# Patient Record
(demographics unavailable — no encounter records)

---

## 2024-10-10 NOTE — LETTER BODY
[FreeTextEntry1] : Dear Dr. Mark Ayala,  Thank you for referring your patient Edward Robles for consultation for fertility concerns.  I have requested several baseline blood studies and a semen analysis. I will see the patient back in followup shortly and make further recommendations. I have attached a copy of my consultation note for your records.  Thank you again for this kind referral. I will certainly keep you updated with further progress. Please do not hesitate to call me if you have any questions.  Best regards,    Jeff Briscoe M.D., PhD Professor of Urology    [facility] [employer] [facility]  for , Reproductive and Sexual Medicine    [facility]

## 2024-10-10 NOTE — LETTER BODY
[FreeTextEntry1] : Dear Dr. Mark Ayala,  Thank you for referring your patient Edward Robles for consultation for fertility concerns.  I have requested several baseline blood studies and a semen analysis. I will see the patient back in followup shortly and make further recommendations. I have attached a copy of my consultation note for your records.  Thank you again for this kind referral. I will certainly keep you updated with further progress. Please do not hesitate to call me if you have any questions.  Best regards,    Jeff Briscoe M.D., PhD Professor of Urology    [facility] of [facility]  for , Reproductive and Sexual Medicine    [facility]

## 2024-10-10 NOTE — HISTORY OF PRESENT ILLNESS
[FreeTextEntry1] : Patient is a 60-year-old gentleman with a history of metastatic melanoma who presents with the chief complaint of impaired fertility for evaluation. The patient denies fevers, chills, nausea and/or vomiting and no unexplained weight loss.   I reviewed the questionnaire he had completed with him in detail.  His partner, age 33, was not able to accompany him to the visit today ([facility]). She has not had any prior pregnancies. As I understand her evaluation has been normal to date. They have been trying to achieve pregnancy for the past 6 months without conception. This issue causes both he and his partner significant distress.   He has no known drug allergies.  His past medical history demonstrates no significant urologic issues (see patient questionnaire).  In his present occupation as a he has no known toxin exposure.  He does not smoke and drinks only socially.  He has no known drug allergies.  His review of systems is non-contributory. His family history is not significant. A chaperone was offered to be present for the examination but declined by the patient.

## 2024-10-10 NOTE — HISTORY OF PRESENT ILLNESS
[FreeTextEntry1] : Patient is a 60-year-old gentleman with a history of metastatic melanoma who presents with the chief complaint of impaired fertility for evaluation. The patient denies fevers, chills, nausea and/or vomiting and no unexplained weight loss.   I reviewed the questionnaire he had completed with him in detail.  His partner, age 33, was not able to accompany him to the visit today ([facility]). She has not had any prior pregnancies. As I understand her evaluation has been normal to date. They have been trying to achieve pregnancy for the past 6 months without conception. This issue causes both he and his partner significant distress.   He has no known drug allergies.  His past medical history demonstrates no significant urologic issues (see patient questionnaire).  In his present occupation as a he has no known toxin exposure.  He does not smoke and drinks only socially.  He has no known drug allergies.  His review of systems is non-contributory. His family history is not significant. A chaperone was offered to be present for the examination but declined by the patient.

## 2024-10-10 NOTE — ASSESSMENT
[FreeTextEntry1] : This pleasant  gentleman presents with concerns regarding his fertility.  I have requested several baseline blood studies, a semen analysis and additional imaging.  Urine dip and microscopic analysis was requested. We discussed his evaluation today and possible options for therapy depending upon the results of his testing. I will make more specific recommendations after the results of the requested tests return. 1. Review blood studies and semen analysis on follow up 2. Discuss options for therapy on follow up 3. Sperm banking per Dr. Ayala  Consultation: 45 minutes reviewing his history and discussing prior results, discussing various treatment options, writing medication prescriptions, requests for lab testing and writing his note. There was also additional time in preparing for the visit.

## 2024-10-23 NOTE — HISTORY OF PRESENT ILLNESS
[de-identified] : Mr. Robles is a 56 year old gentlemen with no significant past medical history presenting to the office for an initial consultation of melanoma.  Patient has been in the [facility] for 6 years. 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 Lived in most of [place] for over 20 years.  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 Has been an athlete for many decades. He is vegan. He swims a up to a couple miles daily.  He is open to complementary medicine, and meditation.   Patient noted development of a solitary hyperpigmented plaque over the right side of chest, which was gradually increasing in diameter with a slight change in color. Patient has history of significant sun exposure and was not fond of using sun protection. Punch Biopsy on 3/2019. Excisional biopsy taken of lesion 9/13/2019. Wide local excision 11/13/2020: Pathology: Consistent with melanoma. Axilla lymph node: 1/1 consistent with melanoma. Thickness, ulceration status and IHC are not available at this time.  CT chest 9/2019: No evidence of metastatic disease 12/23/2020 CT head, chest and abdomen performed in the [facility]: CT head negative for metastatic disease; CT chest demonstrates an increase in the size of the previously seen 8x5 mm nodule in the upper outer right anterior chest which currently measures 14x10 mm.; CT abdomen no evidence of metastatic disease. CT chest - RLL 6 mm and LLL 3 mm (compared with CT 9/2019).   2/19/21: I discussed PET scan and CT with radiologist. Lung lesions too small to confirm melanoma by CT biopsy. Thoracic surgeon would need to be consulted. I called Dr. Woodard, and he believes that the lesion in the right lung is accessible surgically. Also he thinks that there is a possibility of infection as opposed to melanoma. Patient is aware of this and is making an appointment. Patient slides obtained, and confirmed Stage 3b. We discussed that if the lungs are negative for melanoma, then would recommend 1 year adjuvant Opdivo as adjuvant.  3/12/2021 Patient here to review pathology, labs, films. He feels well and is able to play tennis  3/30/2021: 1) metastatic melanoma: Patient is here for C1 NIvolumab.He is feeling well physically and psychologically and ready to start treatment.  We re-reviewed logistics, mechanism of action and most common irAEs from Nivolumab. CT chest 3/30/2021: In comparison with 12/9/2021, status post interval right lower lobe wedge resection. 6 mm right lower lobe nodule (series 2 image 96) is enlarged; previously 2 mm. The remaining 8 mm and smaller lung nodules annotated on series 2 are stable. Difficult to delineate asymmetric soft tissue density lateral to the right pectoral muscle (series 2 image 38) is not significantly changed in size but contains a new small focus of hypodensity (series 601B image 74), possibly postprocedural seroma. 2) COVID-19 infection: Patient was tested positive for COVID-19 on 3/16.  He is asymptomatic and denies fever, chills, change in taste/smell, cough, SOB , diarrhea or fatigue. 3) Vaccine: Rah and Rah vaccine was given on 3/25/2021.   4/14/2021: 1) Metastatic melanoma: Patient is here for toxicity evaluation s/p one cycle of Nivolumab on 3/30/2021.  Patient did well and reports no significant irAEs.  Denies fever, chills, fatigue, arthralgia, myalgia, cough, rash, diarrhea, cough or chest pain. 2) Facial burning: Patient underwent Blue light procedure on 4/13/2021 with Dermatology.  He noticed "burning" on his face a couple of hour after his procedure which is causing some irritation.  He is scheduled to be evaluated by dermatology today.  5/11/2021: Metastatic melanoma: Patient is here for toxicity evaluation s/p two cycle of Nivolumab. Patient did well and reports no significant irAEs.  Denies fever, chills, fatigue, arthralgia, myalgia, cough, rash, diarrhea, cough or chest pain. COVID side effects have improved and completely resolved. No irAEs Has started swimming again [employer] after C#4  5/25/2021: 1) melanoma: Patient is here for C3 Nivolumab.  He is doing well and reports no significant irAEs.   Denies fever, chills, fatigue, arthralgia, myalgia, cough, rash, diarrhea, cough or chest pain. Patient admits to grade one fatigue which he is able to push through.  No restrictions with his ADLs.  He is able to swim. We reviewed labs, no acute abnormality noted.  WIll recheck TSH levels today.  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 patient had covid and is feeling better but not at baseline - he is able to swim long distances but notes he gets fatigued at times when walks distances Due for CT scan next week.  9/14/2021: 1) Melanoma: Patient is here for C7 Nivolumab. Patient is doing well on treatment and voices no new complaints. Denies any significant irAEs. Denies fever, chills,  cough, SOB, arthralgia, myalgia, diarrhea. 2) checkpoint inhibitor induced dermatitis, : Has improved. Currently not using any ointments or cream.  Patient has been moisturizer. 3) Hypothyroid: Patient was evaluated by Endocrine, Dr. Martell who recommended Levothyroxine 125mcg Her thyroid functions are improving  4) Social: Patient is currently living at apartment close to [facility] house. He is currently not working.  11/9/2021: 1) Melanoma: Patient is here for C9 Nivolumab. Patient is doing well on treatment and voices no new complaints. Denies any significant irAEs today.  Denies fever, chills, cough, SOB, arthralgia, myalgia, diarrhea. 2) checkpoint inhibitor induced dermatitis: Has improved on phototherapy & topical steroids not currently using any ointments or cream.  Patient has been moisturizer. 3) Hypothyroid: Patient was evaluated by Endocrine, Dr. Martell who recommended Levothyroxine 137 mcg Her thyroid functions are improving. 4) Right upper quadrant pain: Patient admits to pain in the right lateral abdominal area x few months. Will have radiology review imaging from 10/2021. Appears to be musculoskeletal in nature.   1) Melanoma:  C12 Nivolumab. He is tolerating treatment well and reports no significant irAEs. Labs reviewed with patient on 1/4/2022, no acute abnormality. CT chest, abdomen/pelvis ordered. He has no restrictions with his ADLs. He is now ice skating. Diet and weight remain stable. He is being followed by Dr. 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  Currently on Levothyroxine 137 mcg.  3/1/2022 1)Melanoma C13 Nivolumab today. He is tolerating treatment extremely well and reports no significant irAEs. Patient underwent CT chest, abdomen/pelvis on 2/15/2022 which demonstrated the previously noted right lower lobe nodule no longer visualized. Otherwise, stable exam. He was evaluated by endocrine for hypothyroid, patient is now on Levothyroxine 150 mcg daily. He has no restrictions with his ADLs. Patient admits to grade one fatigue but is able to push through . He is able to swim ~ 2 miles/day.  Continues UV q 2 weeks, and will discuss stopping it. Notes dry mouth and taste.  3/29/2022: C14 Nivolumab today. He is tolerating treatment relatively well and reports no significant irAEs. He has no restrictions with his ADLs. Labs reviewed and LFT's are trending upwards. Grade one fatigue but is able to push through. He is currently on Levothyroxine 150 mcg daily and doing well. He is adjusting to the dose and will not start Cytomel at this time. Due to dry mouth Sjogren syndrome labs were ordered however not performed last visit, and will do so this time. He does note improved taste after a course of clotrimazole.  4/26/2022: C15 Nivolumab today. Labs reviewed, no acute abnormality. Thyroid functions remain WNL. Sjogren labs performed due to dry mouth, labs WNL. Tolerating treatment extremely well and reports on significant irAEs. Danny (Signatera) performed on 3/1/2022: Signatera Positive ; MTM/mL: 0.65  5/24/22 Patient will have C16 nivolumab. Repeat ct DNA next cycle. If positive, get PET. If negative, consider a few more treatments and stop or complete 2 year course. Thyroid issue seems to be addressed. Phototherapy is now once a month. Had stopped and then restarted due to increase in dermatitis. Mental state is positive. Remains underactive in terms of exercise. Has been supporting his father and family with medical issues.  7/19/2022 C18 Nivolumab today. Patient is tolerating treatment well and reports no significant irAEs. Patient is no longer receiving light therapy from dermatology. Patient is currently on Levothyroxine from 150 mcg to 137 mcg. Admits to grade one fatigue intermittently but has improved. He is now back to baseline. He continues to swim and has no restrictions with his ADLs.  8/16/22 C19 Nivolumab Doing well overall. Lisa has converted to negative on 7/19/22 Will continue CT scan q 4 months. Needs full TFTs today. Able to swim for long periods of time. Stopped phototherapy for  checkpoint inhibitor induced dermatitis. His only complaint is ear scaling.  9/15/2022: C20 Nivolumab on 9/13/2022 He is doing well on treatment and voices no major irAEs. Grade one fatigue but is able to push through. We reviewed his CT chest, abdomen/pelvis on 8/2022. Next CT 12/2022. Lisa on draw#2 negative on 7/29/2022 Next draw 10/2022.  10/07/2022 S/P C20 Nivolumab on 09/13/2022, and next cycle will be on 10/11/2022 He c/o pain to left side lower back. Also, he has some fatigue. His ear infection has cleared with use of medicated drops. CT-Scan of chest of 8/25/2022 showed slight increase of right chest wall nodule. Will get US and possible biopsy of nodule to r/o abnormal pathology.  11/08/2022: C22 Nivolumab today. US biopsy of chest wall negative on 10/2022. Patient is feeling well today and voices no major irAEs. Admits to grade one fatigue intermittently however is able to push through. Danny franklin D#3 today. Intermittent rash on his lower back and forehead.  He is using Triamcinolone on his lower back and Metronidazole on his forehead.  12/6/22 Patient here for C23 Will complete one more dose and then start surveillance with scans and ct-DNA q 3 months. Will obtain lab monitoring at home. He dose still have fatigue and thinks that it is related to low testosterone. Has not been sexually active.  01/03/2023 C24 Nivolumab today. Lisa Delgado D#3 on 11/2022 negative. D#4 02/2023 CT chest, abdomen/pelvis on 12/15/2022 which revealed no evidence of recurrent and/or metastatic disease. He is tolerating treatment relatively well and reports no major irAEs. Labs reviewed. Hormones are WNL.  01/31/2023: C25 Nivolumab today. Will treat for 26 cycles and stop. No major irAEs. D#4 Signatera on 01/03/2023 negative. He is doing well and voices no major complaints. Patient has gained weight while on treatment. Has not been exercising as often.  02/28/2023 C26 Nivolumab today (last treatment) No major irAEs. CT chest, abdomen/pelvis on 03/02/2023. He was seen by cardiology (Dr. Bingham) due to CORLEY. He underwent ECG and TTE which revealed no acute abnormality. His EF is 59%, normal LV systolic function. His exercise induced stress test did not reveal any acute abnormality. He was advised to undergo CT chest (calcium score) and will schedule.  07/07/2023 He is here for his follow up for metastatic melanoma. He was seen by Dr. Zaragoza (Ortho) for bilateral foot pain, R>L and swelling x 3 weeks. Patient has been doing PT and home stretch exercises which has been helping. His last signatera on 04/2023 was negative. Will repeat today. Last CT on 03/2023 stable. Next CT on 09/2023. Patient continues to have issues with his energy level. He is resting more and not as active however has no restrictions with his routine/instrumental ADLs. Patient has gained weight.  3/7/2024 Signatera Danny 2/19/2024 = 0.00 CT 2/21/2024 LUNGS AND LARGE AIRWAYS: Patent central airways. Stable submillimeter right upper lobe pulmonary nodule on image 27 of series 303. Left lower lobe tiny nodules bilaterally seen on the current exam. Patient is again noted to be post right lower lobe wedge resection PLEURA: No pleural effusion. VESSELS: Within normal limits. HEART: Heart size is normal. No pericardial effusion. MEDIASTINUM AND BRIDGER: No lymphadenopathy. CHEST WALL AND LOWER NECK: Scattered subcutaneous nodules similar in appearance to prior study. Representative nodule on image 29 of series 303 measures 7 mm and is unchanged. Right axillary scarring is also unchanged. Right sided mild gynecomastia is stable as well. LIVER: Within normal limits. BILE DUCTS: Normal caliber. GALLBLADDER: Within normal limits. SPLEEN: Unremarkable PANCREAS: Within normal limits. ADRENALS: Within normal limits. KIDNEYS/URETERS: Within normal limits. BLADDER: Minimally distended. REPRODUCTIVE ORGANS: Prostate within normal limits. BOWEL: No bowel obstruction. Appendix is normal. A small hiatal hernia is seen with mild colonic diverticulosis without diverticulitis PERITONEUM: No ascites VESSELS: Within normal limits. RETROPERITONEUM/LYMPH NODES: No lymphadenopathy. Prominent bilateral inguinal lymph nodes are unchanged. ABDOMINAL WALL: 2 small fat-containing inguinal hernias. Small midline fat-containing ventral hernia. BONES: Degenerative changes. IMPRESSION: Stable examination.  Feeling well and voices no new complaints. Addressed all questions. Looking to lose some weight. Plans to go to the [facility]. He is looking to take care of his parents more here, and work with [facility] remotely.  7/1/24 Last Signatera Danny on 6/12 is not detected. Will repeat in October 2024. Feeling well and voices no major complaints. 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  10/3/2024 Patient with h/o melanoma just in lungs. He was on treatment for 2 years. Had imaging done at [facility]. I had it loaded. Will ask radiology to review the PET - there is a liver and spine T7 lesion. Had rise of Signatera.  10/23/24 He is for C1 Ipilimumab (3) + Nivolumab (1) Was evaluated by Dr. Jeff Briscoe (Urology) for sperm banking. Patient wanted to discuss his labs prior to receiving therapy. He was educated no contraindications to receive therapy with positive Hb AB. He was positive in the past (2/2021) and had negative antigens. He was told his syphillis titers were positive. Unable to located in his chart. Requested labs be sent over to our team. He will need to be seen one week prior to C2 for toxicity evaluation and labs. [de-identified] : Surgical Oncology: Santos Seals\par  Thoracic Surgeon: Gatito Woodard \par  \par  Pt's sister Yohana Baca is best contact:  [phone]\par  \par  Patient sister home: [phone]\par  Patient new cell: [phone]\par  Back up number - [phone]

## 2024-10-23 NOTE — ASSESSMENT
[FreeTextEntry1] : Patient has melanoma on right chest resected 11/2020. A positive right axillary node also found. On CT scan 12/2020 there are 2 subcentimeter nodules that are reported to be new since 9/2019. Patient has returned from [facility] for further evaluation and treatment. This is considered Stage 3b. PET and CT are reviewed today. The CT shows nodules, and patient underwent biopsy with Dr Woodard.   Nivolumab  C1 3/30/2021. C26 02/28/2023 - last treatment.  Danny (Signatera) performed on 3/1/2022: Signatera Positive had converted to negative on 7/19/2022.  Then it turned positive again August 29, 2024 ctDNA = 10.11  Patient with h/o melanoma just in lungs. He was on treatment for 2 years. Had imaging done at [facility]. I had it loaded. Will ask radiology to review the PET - there is a liver and spine T7 lesion. Had rise of Signatera.  IPI 3 + NIVO 1 C1D1 10/23  He is for C1 Ipilimumab (3) + Nivolumab (1)  Was evaluated by Dr. Jeff Briscoe (Urology) for sperm banking. Patient wanted to discuss his labs prior to receiving therapy. He was educated no contraindications to receive therapy with positive Hb AB. He was positive in the past (2/2021) and had negative antigens. He was told his syphillis titers were positive. Unable to located in his chart. Requested labs be sent over to our team.  He will need to be seen one week prior to C2 for toxicity evaluation and labs.  Dr. Ayala agrees with plan.

## 2024-10-25 NOTE — PHYSICAL EXAM
[General Appearance - Alert] : alert [General Appearance - In No Acute Distress] : in no acute distress [] : no respiratory distress [Respiration, Rhythm And Depth] : normal respiratory rhythm and effort [Exaggerated Use Of Accessory Muscles For Inspiration] : no accessory muscle use [Heart Rate And Rhythm] : heart rate and rhythm were normal [Arterial Pulses Normal] : the arterial pulses were normal [Abdomen Soft] : soft [Nondistended] : nondistended [Nail Clubbing] : no clubbing  or cyanosis of the fingernails [Normal] : normal skin color and pigmentation and no rash [No Focal Deficits] : no focal deficits [Oriented To Time, Place, And Person] : oriented to person, place, and time

## 2024-10-30 NOTE — HISTORY OF PRESENT ILLNESS
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 Punch Biopsy on 3/2019. Excisional biopsy taken of lesion 9/13/2019. Wide local excision 11/13/2020: Pathology: Consistent with melanoma. Axilla lymph node: 1/1 consistent with melanoma. Thickness, ulceration status and IHC are not available at this time.  CT chest 9/2019: No evidence of metastatic disease 12/23/2020 CT head, chest and abdomen performed in the [facility]: CT head negative for metastatic disease; CT chest demonstrates an increase in the size of the previously seen 8x5 mm nodule in the upper outer right anterior chest which currently measures 14x10 mm.; CT abdomen no evidence of metastatic disease. CT chest - RLL 6 mm and LLL 3 mm (compared with CT 9/2019)  3/2/2021 Patient underwent Lung RLL wedge resection for an increasing lung nodule, pathology consistent with metstatsic melanoma   3/30/2021 Patient was started on Nivolumab for  metastatic melanoma:  CT chest 3/30/2021: In comparison with 12/9/2021, status post interval right lower lobe wedge resection. 6 mm right lower lobe nodule (series 2 image 96) is enlarged; previously 2 mm. The remaining 8 mm and smaller lung nodules annotated on series 2 are stable. Difficult to delineate asymmetric soft tissue density lateral to the right pectoral muscle (series 2 image 38) is not significantly changed in size but contains a new small focus of hypodensity (series 601B image 74), possibly postprocedural seroma.  9/18/2024 MRI Brain showed No evidence of brain metastases  9/18/2024 PET/CT showed FDG avid lesions as described above including gallbladder fossa and right T7 vertebral body  10/10/2024 MRI Thoracic spine Indeterminate enhancing T1 hypointense lesion within the right aspect of the T7 vertebral body, which exhibits FDG avidity on prior PET/CT dated 9/18/2024. No significant FDG avidity was seen in this region on prior PET/CT dated 2/9/2021. This lesion is suspicious for metastatic disease within the T7 vertebral body Indeterminate STIR hyperintense, possibly enhancing focus within the T10 vertebral body, measuring up to 3 mm, which may represent a tiny hemangioma versus metastatic disease within the T10 vertebral body. Attention on follow-up imaging is recommended.  10/18/2024 CT chest/abdomen/pelvis New hepatic segment 5 lesion which correlates with FDG avid focus seen in PET/CT, concerning for metastasis. There is a new lytic lesion in the T7 vertebral body which correlates with FDG avid focus seen in PET/CT, also concerning for metastasis.  10/21/24 Patient presents to discuss the role of radiation therapy in his care. Denies pain. Generally doing well.

## 2024-10-30 NOTE — HISTORY OF PRESENT ILLNESS
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 Punch Biopsy on 3/2019. Excisional biopsy taken of lesion 9/13/2019. Wide local excision 11/13/2020: Pathology: Consistent with melanoma. Axilla lymph node: 1/1 consistent with melanoma. Thickness, ulceration status and IHC are not available at this time.  CT chest 9/2019: No evidence of metastatic disease 12/23/2020 CT head, chest and abdomen performed in the [facility]: CT head negative for metastatic disease; CT chest demonstrates an increase in the size of the previously seen 8x5 mm nodule in the upper outer right anterior chest which currently measures 14x10 mm.; CT abdomen no evidence of metastatic disease. CT chest - RLL 6 mm and LLL 3 mm (compared with CT 9/2019)  3/2/2021 Patient underwent Lung RLL wedge resection for an increasing lung nodule, pathology consistent with metstatsic melanoma   3/30/2021 Patient was started on Nivolumab for  metastatic melanoma:  CT chest 3/30/2021: In comparison with 12/9/2021, status post interval right lower lobe wedge resection. 6 mm right lower lobe nodule (series 2 image 96) is enlarged; previously 2 mm. The remaining 8 mm and smaller lung nodules annotated on series 2 are stable. Difficult to delineate asymmetric soft tissue density lateral to the right pectoral muscle (series 2 image 38) is not significantly changed in size but contains a new small focus of hypodensity (series 601B image 74), possibly postprocedural seroma.  9/18/2024 MRI Brain showed No evidence of brain metastases  9/18/2024 PET/CT showed FDG avid lesions as described above including gallbladder fossa and right T7 vertebral body  10/10/2024 MRI Thoracic spine Indeterminate enhancing T1 hypointense lesion within the right aspect of the T7 vertebral body, which exhibits FDG avidity on prior PET/CT dated 9/18/2024. No significant FDG avidity was seen in this region on prior PET/CT dated 2/9/2021. This lesion is suspicious for metastatic disease within the T7 vertebral body Indeterminate STIR hyperintense, possibly enhancing focus within the T10 vertebral body, measuring up to 3 mm, which may represent a tiny hemangioma versus metastatic disease within the T10 vertebral body. Attention on follow-up imaging is recommended.  10/18/2024 CT chest/abdomen/pelvis New hepatic segment 5 lesion which correlates with FDG avid focus seen in PET/CT, concerning for metastasis. There is a new lytic lesion in the T7 vertebral body which correlates with FDG avid focus seen in PET/CT, also concerning for metastasis.  10/21/24 Patient presents to discuss the role of radiation therapy in his care. Denies pain. Generally doing well.

## 2024-10-30 NOTE — HISTORY OF PRESENT ILLNESS
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 Punch Biopsy on 3/2019. Excisional biopsy taken of lesion 9/13/2019. Wide local excision 11/13/2020: Pathology: Consistent with melanoma. Axilla lymph node: 1/1 consistent with melanoma. Thickness, ulceration status and IHC are not available at this time.  CT chest 9/2019: No evidence of metastatic disease 12/23/2020 CT head, chest and abdomen performed in the [facility]: CT head negative for metastatic disease; CT chest demonstrates an increase in the size of the previously seen 8x5 mm nodule in the upper outer right anterior chest which currently measures 14x10 mm.; CT abdomen no evidence of metastatic disease. CT chest - RLL 6 mm and LLL 3 mm (compared with CT 9/2019)  3/2/2021 Patient underwent Lung RLL wedge resection for an increasing lung nodule, pathology consistent with metstatsic melanoma   3/30/2021 Patient was started on Nivolumab for  metastatic melanoma:  CT chest 3/30/2021: In comparison with 12/9/2021, status post interval right lower lobe wedge resection. 6 mm right lower lobe nodule (series 2 image 96) is enlarged; previously 2 mm. The remaining 8 mm and smaller lung nodules annotated on series 2 are stable. Difficult to delineate asymmetric soft tissue density lateral to the right pectoral muscle (series 2 image 38) is not significantly changed in size but contains a new small focus of hypodensity (series 601B image 74), possibly postprocedural seroma.  9/18/2024 MRI Brain showed No evidence of brain metastases  9/18/2024 PET/CT showed FDG avid lesions as described above including gallbladder fossa and right T7 vertebral body  10/10/2024 MRI Thoracic spine Indeterminate enhancing T1 hypointense lesion within the right aspect of the T7 vertebral body, which exhibits FDG avidity on prior PET/CT dated 9/18/2024. No significant FDG avidity was seen in this region on prior PET/CT dated 2/9/2021. This lesion is suspicious for metastatic disease within the T7 vertebral body Indeterminate STIR hyperintense, possibly enhancing focus within the T10 vertebral body, measuring up to 3 mm, which may represent a tiny hemangioma versus metastatic disease within the T10 vertebral body. Attention on follow-up imaging is recommended.  10/18/2024 CT chest/abdomen/pelvis New hepatic segment 5 lesion which correlates with FDG avid focus seen in PET/CT, concerning for metastasis. There is a new lytic lesion in the T7 vertebral body which correlates with FDG avid focus seen in PET/CT, also concerning for metastasis.  10/21/24 Patient presents to discuss the role of radiation therapy in his care. Denies pain. Generally doing well.

## 2024-10-30 NOTE — HISTORY OF PRESENT ILLNESS
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 Punch Biopsy on 3/2019. Excisional biopsy taken of lesion 9/13/2019. Wide local excision 11/13/2020: Pathology: Consistent with melanoma. Axilla lymph node: 1/1 consistent with melanoma. Thickness, ulceration status and IHC are not available at this time.  CT chest 9/2019: No evidence of metastatic disease 12/23/2020 CT head, chest and abdomen performed in the [facility]: CT head negative for metastatic disease; CT chest demonstrates an increase in the size of the previously seen 8x5 mm nodule in the upper outer right anterior chest which currently measures 14x10 mm.; CT abdomen no evidence of metastatic disease. CT chest - RLL 6 mm and LLL 3 mm (compared with CT 9/2019)  3/2/2021 Patient underwent Lung RLL wedge resection for an increasing lung nodule, pathology consistent with metstatsic melanoma   3/30/2021 Patient was started on Nivolumab for  metastatic melanoma:  CT chest 3/30/2021: In comparison with 12/9/2021, status post interval right lower lobe wedge resection. 6 mm right lower lobe nodule (series 2 image 96) is enlarged; previously 2 mm. The remaining 8 mm and smaller lung nodules annotated on series 2 are stable. Difficult to delineate asymmetric soft tissue density lateral to the right pectoral muscle (series 2 image 38) is not significantly changed in size but contains a new small focus of hypodensity (series 601B image 74), possibly postprocedural seroma.  9/18/2024 MRI Brain showed No evidence of brain metastases  9/18/2024 PET/CT showed FDG avid lesions as described above including gallbladder fossa and right T7 vertebral body  10/10/2024 MRI Thoracic spine Indeterminate enhancing T1 hypointense lesion within the right aspect of the T7 vertebral body, which exhibits FDG avidity on prior PET/CT dated 9/18/2024. No significant FDG avidity was seen in this region on prior PET/CT dated 2/9/2021. This lesion is suspicious for metastatic disease within the T7 vertebral body Indeterminate STIR hyperintense, possibly enhancing focus within the T10 vertebral body, measuring up to 3 mm, which may represent a tiny hemangioma versus metastatic disease within the T10 vertebral body. Attention on follow-up imaging is recommended.  10/18/2024 CT chest/abdomen/pelvis New hepatic segment 5 lesion which correlates with FDG avid focus seen in PET/CT, concerning for metastasis. There is a new lytic lesion in the T7 vertebral body which correlates with FDG avid focus seen in PET/CT, also concerning for metastasis.  10/21/24 Patient presents to discuss the role of radiation therapy in his care. Denies pain. Generally doing well.

## 2024-11-06 NOTE — HISTORY OF PRESENT ILLNESS
[de-identified] : Mr. Robles is a 56 year old gentlemen with no significant past medical history presenting to the office for an initial consultation of melanoma.  Patient has been in the [facility] for 6 years. 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 Lived in most of [place] for over 20 years.  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 Has been an athlete for many decades. He is vegan. He swims a up to a couple miles daily.  He is open to complementary medicine, and meditation.   Patient noted development of a solitary hyperpigmented plaque over the right side of chest, which was gradually increasing in diameter with a slight change in color. Patient has history of significant sun exposure and was not fond of using sun protection. Punch Biopsy on 3/2019. Excisional biopsy taken of lesion 9/13/2019. Wide local excision 11/13/2020: Pathology: Consistent with melanoma. Axilla lymph node: 1/1 consistent with melanoma. Thickness, ulceration status and IHC are not available at this time.  CT chest 9/2019: No evidence of metastatic disease 12/23/2020 CT head, chest and abdomen performed in the [facility]: CT head negative for metastatic disease; CT chest demonstrates an increase in the size of the previously seen 8x5 mm nodule in the upper outer right anterior chest which currently measures 14x10 mm.; CT abdomen no evidence of metastatic disease. CT chest - RLL 6 mm and LLL 3 mm (compared with CT 9/2019).   2/19/21: I discussed PET scan and CT with radiologist. Lung lesions too small to confirm melanoma by CT biopsy. Thoracic surgeon would need to be consulted. I called Dr. Woodard, and he believes that the lesion in the right lung is accessible surgically. Also he thinks that there is a possibility of infection as opposed to melanoma. Patient is aware of this and is making an appointment. Patient slides obtained, and confirmed Stage 3b. We discussed that if the lungs are negative for melanoma, then would recommend 1 year adjuvant Opdivo as adjuvant.  3/12/2021 Patient here to review pathology, labs, films. He feels well and is able to play tennis  3/30/2021: 1) metastatic melanoma: Patient is here for C1 NIvolumab.He is feeling well physically and psychologically and ready to start treatment.  We re-reviewed logistics, mechanism of action and most common irAEs from Nivolumab. CT chest 3/30/2021: In comparison with 12/9/2021, status post interval right lower lobe wedge resection. 6 mm right lower lobe nodule (series 2 image 96) is enlarged; previously 2 mm. The remaining 8 mm and smaller lung nodules annotated on series 2 are stable. Difficult to delineate asymmetric soft tissue density lateral to the right pectoral muscle (series 2 image 38) is not significantly changed in size but contains a new small focus of hypodensity (series 601B image 74), possibly postprocedural seroma. 2) COVID-19 infection: Patient was tested positive for COVID-19 on 3/16.  He is asymptomatic and denies fever, chills, change in taste/smell, cough, SOB , diarrhea or fatigue. 3) Vaccine: Rah and Rah vaccine was given on 3/25/2021.   4/14/2021: 1) Metastatic melanoma: Patient is here for toxicity evaluation s/p one cycle of Nivolumab on 3/30/2021.  Patient did well and reports no significant irAEs.  Denies fever, chills, fatigue, arthralgia, myalgia, cough, rash, diarrhea, cough or chest pain. 2) Facial burning: Patient underwent Blue light procedure on 4/13/2021 with Dermatology.  He noticed "burning" on his face a couple of hour after his procedure which is causing some irritation.  He is scheduled to be evaluated by dermatology today.  5/11/2021: Metastatic melanoma: Patient is here for toxicity evaluation s/p two cycle of Nivolumab. Patient did well and reports no significant irAEs.  Denies fever, chills, fatigue, arthralgia, myalgia, cough, rash, diarrhea, cough or chest pain. COVID side effects have improved and completely resolved. No irAEs Has started swimming again [employer] after C#4  5/25/2021: 1) melanoma: Patient is here for C3 Nivolumab.  He is doing well and reports no significant irAEs.   Denies fever, chills, fatigue, arthralgia, myalgia, cough, rash, diarrhea, cough or chest pain. Patient admits to grade one fatigue which he is able to push through.  No restrictions with his ADLs.  He is able to swim. We reviewed labs, no acute abnormality noted.  WIll recheck TSH levels today.  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 patient had covid and is feeling better but not at baseline - he is able to swim long distances but notes he gets fatigued at times when walks distances Due for CT scan next week.  9/14/2021: 1) Melanoma: Patient is here for C7 Nivolumab. Patient is doing well on treatment and voices no new complaints. Denies any significant irAEs. Denies fever, chills,  cough, SOB, arthralgia, myalgia, diarrhea. 2) checkpoint inhibitor induced dermatitis, : Has improved. Currently not using any ointments or cream.  Patient has been moisturizer. 3) Hypothyroid: Patient was evaluated by Endocrine, Dr. Martell who recommended Levothyroxine 125mcg Her thyroid functions are improving  4) Social: Patient is currently living at apartment close to [facility] house. He is currently not working.  11/9/2021: 1) Melanoma: Patient is here for C9 Nivolumab. Patient is doing well on treatment and voices no new complaints. Denies any significant irAEs today.  Denies fever, chills, cough, SOB, arthralgia, myalgia, diarrhea. 2) checkpoint inhibitor induced dermatitis: Has improved on phototherapy & topical steroids not currently using any ointments or cream.  Patient has been moisturizer. 3) Hypothyroid: Patient was evaluated by Endocrine, Dr. Martell who recommended Levothyroxine 137 mcg Her thyroid functions are improving. 4) Right upper quadrant pain: Patient admits to pain in the right lateral abdominal area x few months. Will have radiology review imaging from 10/2021. Appears to be musculoskeletal in nature.   1) Melanoma:  C12 Nivolumab. He is tolerating treatment well and reports no significant irAEs. Labs reviewed with patient on 1/4/2022, no acute abnormality. CT chest, abdomen/pelvis ordered. He has no restrictions with his ADLs. He is now ice skating. Diet and weight remain stable. He is being followed by Dr. 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  Currently on Levothyroxine 137 mcg.  3/1/2022 1)Melanoma C13 Nivolumab today. He is tolerating treatment extremely well and reports no significant irAEs. Patient underwent CT chest, abdomen/pelvis on 2/15/2022 which demonstrated the previously noted right lower lobe nodule no longer visualized. Otherwise, stable exam. He was evaluated by endocrine for hypothyroid, patient is now on Levothyroxine 150 mcg daily. He has no restrictions with his ADLs. Patient admits to grade one fatigue but is able to push through . He is able to swim ~ 2 miles/day.  Continues UV q 2 weeks, and will discuss stopping it. Notes dry mouth and taste.  3/29/2022: C14 Nivolumab today. He is tolerating treatment relatively well and reports no significant irAEs. He has no restrictions with his ADLs. Labs reviewed and LFT's are trending upwards. Grade one fatigue but is able to push through. He is currently on Levothyroxine 150 mcg daily and doing well. He is adjusting to the dose and will not start Cytomel at this time. Due to dry mouth Sjogren syndrome labs were ordered however not performed last visit, and will do so this time. He does note improved taste after a course of clotrimazole.  4/26/2022: C15 Nivolumab today. Labs reviewed, no acute abnormality. Thyroid functions remain WNL. Sjogren labs performed due to dry mouth, labs WNL. Tolerating treatment extremely well and reports on significant irAEs. Danny (Signatera) performed on 3/1/2022: Signatera Positive ; MTM/mL: 0.65  5/24/22 Patient will have C16 nivolumab. Repeat ct DNA next cycle. If positive, get PET. If negative, consider a few more treatments and stop or complete 2 year course. Thyroid issue seems to be addressed. Phototherapy is now once a month. Had stopped and then restarted due to increase in dermatitis. Mental state is positive. Remains underactive in terms of exercise. Has been supporting his father and family with medical issues.  7/19/2022 C18 Nivolumab today. Patient is tolerating treatment well and reports no significant irAEs. Patient is no longer receiving light therapy from dermatology. Patient is currently on Levothyroxine from 150 mcg to 137 mcg. Admits to grade one fatigue intermittently but has improved. He is now back to baseline. He continues to swim and has no restrictions with his ADLs.  8/16/22 C19 Nivolumab Doing well overall. Lisa has converted to negative on 7/19/22 Will continue CT scan q 4 months. Needs full TFTs today. Able to swim for long periods of time. Stopped phototherapy for  checkpoint inhibitor induced dermatitis. His only complaint is ear scaling.  9/15/2022: C20 Nivolumab on 9/13/2022 He is doing well on treatment and voices no major irAEs. Grade one fatigue but is able to push through. We reviewed his CT chest, abdomen/pelvis on 8/2022. Next CT 12/2022. Lisa on draw#2 negative on 7/29/2022 Next draw 10/2022.  10/07/2022 S/P C20 Nivolumab on 09/13/2022, and next cycle will be on 10/11/2022 He c/o pain to left side lower back. Also, he has some fatigue. His ear infection has cleared with use of medicated drops. CT-Scan of chest of 8/25/2022 showed slight increase of right chest wall nodule. Will get US and possible biopsy of nodule to r/o abnormal pathology.  11/08/2022: C22 Nivolumab today. US biopsy of chest wall negative on 10/2022. Patient is feeling well today and voices no major irAEs. Admits to grade one fatigue intermittently however is able to push through. Danny franklin D#3 today. Intermittent rash on his lower back and forehead.  He is using Triamcinolone on his lower back and Metronidazole on his forehead.  12/6/22 Patient here for C23 Will complete one more dose and then start surveillance with scans and ct-DNA q 3 months. Will obtain lab monitoring at home. He dose still have fatigue and thinks that it is related to low testosterone. Has not been sexually active.  01/03/2023 C24 Nivolumab today. Lisa Delgado D#3 on 11/2022 negative. D#4 02/2023 CT chest, abdomen/pelvis on 12/15/2022 which revealed no evidence of recurrent and/or metastatic disease. He is tolerating treatment relatively well and reports no major irAEs. Labs reviewed. Hormones are WNL.  01/31/2023: C25 Nivolumab today. Will treat for 26 cycles and stop. No major irAEs. D#4 Signatera on 01/03/2023 negative. He is doing well and voices no major complaints. Patient has gained weight while on treatment. Has not been exercising as often.  02/28/2023 C26 Nivolumab today (last treatment) No major irAEs. CT chest, abdomen/pelvis on 03/02/2023. He was seen by cardiology (Dr. Bingham) due to CORLEY. He underwent ECG and TTE which revealed no acute abnormality. His EF is 59%, normal LV systolic function. His exercise induced stress test did not reveal any acute abnormality. He was advised to undergo CT chest (calcium score) and will schedule.  07/07/2023 He is here for his follow up for metastatic melanoma. He was seen by Dr. Zaragoza (Ortho) for bilateral foot pain, R>L and swelling x 3 weeks. Patient has been doing PT and home stretch exercises which has been helping. His last signatera on 04/2023 was negative. Will repeat today. Last CT on 03/2023 stable. Next CT on 09/2023. Patient continues to have issues with his energy level. He is resting more and not as active however has no restrictions with his routine/instrumental ADLs. Patient has gained weight.  3/7/2024 Signatera Danny 2/19/2024 = 0.00 CT 2/21/2024 LUNGS AND LARGE AIRWAYS: Patent central airways. Stable submillimeter right upper lobe pulmonary nodule on image 27 of series 303. Left lower lobe tiny nodules bilaterally seen on the current exam. Patient is again noted to be post right lower lobe wedge resection PLEURA: No pleural effusion. VESSELS: Within normal limits. HEART: Heart size is normal. No pericardial effusion. MEDIASTINUM AND BRIDGER: No lymphadenopathy. CHEST WALL AND LOWER NECK: Scattered subcutaneous nodules similar in appearance to prior study. Representative nodule on image 29 of series 303 measures 7 mm and is unchanged. Right axillary scarring is also unchanged. Right sided mild gynecomastia is stable as well. LIVER: Within normal limits. BILE DUCTS: Normal caliber. GALLBLADDER: Within normal limits. SPLEEN: Unremarkable PANCREAS: Within normal limits. ADRENALS: Within normal limits. KIDNEYS/URETERS: Within normal limits. BLADDER: Minimally distended. REPRODUCTIVE ORGANS: Prostate within normal limits. BOWEL: No bowel obstruction. Appendix is normal. A small hiatal hernia is seen with mild colonic diverticulosis without diverticulitis PERITONEUM: No ascites VESSELS: Within normal limits. RETROPERITONEUM/LYMPH NODES: No lymphadenopathy. Prominent bilateral inguinal lymph nodes are unchanged. ABDOMINAL WALL: 2 small fat-containing inguinal hernias. Small midline fat-containing ventral hernia. BONES: Degenerative changes. IMPRESSION: Stable examination.  Feeling well and voices no new complaints. Addressed all questions. Looking to lose some weight. Plans to go to the [facility]. He is looking to take care of his parents more here, and work with [facility] remotely.  7/1/24 Last Signatera Danny on 6/12 is not detected. Will repeat in October 2024. Feeling well and voices no major complaints. 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  10/3/2024 Patient with h/o melanoma just in lungs. He was on treatment for 2 years. Had imaging done at [facility]. I had it loaded. Will ask radiology to review the PET - there is a liver and spine T7 lesion. Had rise of Signatera.  10/23/24 He is for C1 Ipilimumab (3) + Nivolumab (1) Was evaluated by Dr. Jeff Briscoe (Urology) for sperm banking. Patient wanted to discuss his labs prior to receiving therapy. He was educated no contraindications to receive therapy with positive Hb AB. He was positive in the past (2/2021) and had negative antigens. He was told his syphillis titers were positive. Unable to located in his chart. Requested labs be sent over to our team. He will need to be seen one week prior to C2 for toxicity evaluation and labs.  11/6/24 Patient is here for toxicity evaluation s/p 1 cycle of Ipilimumab (3) + Nivolumab (1). Did well and voices no major irAEs. Mild headache one day which has subsided Labs today. 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 Mild fatigue but is able to push through.  [de-identified] : Surgical Oncology: Santos Seals\par  Thoracic Surgeon: Gatito Woodard \par  \par  Pt's sister Yohana Baca is best contact:  [phone]\par  \par  Patient sister home: [phone]\par  Patient new cell: [phone]\par  Back up number - [phone]

## 2024-11-06 NOTE — ASSESSMENT
[FreeTextEntry1] : Patient has melanoma on right chest resected 11/2020. A positive right axillary node also found. On CT scan 12/2020 there are 2 subcentimeter nodules that are reported to be new since 9/2019. Patient has returned from [facility] for further evaluation and treatment. This is considered Stage 3b. PET and CT are reviewed today. The CT shows nodules, and patient underwent biopsy with Dr Woodard.   Nivolumab  C1 3/30/2021. C26 02/28/2023 - last treatment.  Danny (Signatera) performed on 3/1/2022: Signatera Positive had converted to negative on 7/19/2022.  Then it turned positive again August 29, 2024 ctDNA = 10.11  Patient with h/o melanoma just in lungs. He was on treatment for 2 years. Had imaging done at [facility]. I had it loaded. Will ask radiology to review the PET - there is a liver and spine T7 lesion. Had rise of Signatera.  IPI 3 + NIVO 1 C1D1 10/23 C2 scheduled for 11/13  Patient is here for toxicity evaluation s/p 1 cycle of Ipilimumab (3) + Nivolumab (1).  Did well and voices no major irAEs. Mild headache one day which has subsided Labs today. 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 Mild fatigue but is able to push through  Will plan for SBRT 27Gy/3fx to T7 starting next week.  labs today.  f/u one week prior to C3 for labs and examination.  Dr. Ayala agrees with above plan.

## 2024-11-07 NOTE — PHYSICAL EXAM
[General Appearance - Well Developed] : well developed [General Appearance - Well Nourished] : well nourished [Normal Appearance] : normal appearance [Well Groomed] : well groomed [General Appearance - In No Acute Distress] : no acute distress [Heart Rate And Rhythm] : heart rate and rhythm were normal [Edema] : no peripheral edema [Arterial Pulses Normal] : the pedal pulses were normal [Respiration, Rhythm And Depth] : normal respiratory rhythm and effort [Exaggerated Use Of Accessory Muscles For Inspiration] : no accessory muscle use [Auscultation Breath Sounds / Voice Sounds] : lungs were clear to auscultation bilaterally [Chest Palpation] : palpation of the chest revealed no abnormalities [Lungs Percussion] : the lungs were normal to percussion [Bowel Sounds] : normal bowel sounds [Abdomen Soft] : soft [Abdomen Tenderness] : non-tender [Abdomen Mass (___ Cm)] : no abdominal mass palpated [Abdomen Hernia] : no hernia was discovered [Costovertebral Angle Tenderness] : no ~M costovertebral angle tenderness [Urethral Meatus] : meatus normal [Penis Abnormality] : normal circumcised penis [Urinary Bladder Findings] : the bladder was normal on palpation [Scrotum] : the scrotum was normal [Epididymis] : the epididymides were normal [Testes Tenderness] : no tenderness of the testes [Testes Mass (___cm)] : there were no testicular masses [Anus Abnormality] : the anus and perineum were normal [Normal Station and Gait] : the gait and station were normal for the patient's age [Skin Color & Pigmentation] : normal skin color and pigmentation [Skin Turgor] : supple [] : no rash [Skin Lesions] : no skin lesions [No Focal Deficits] : no focal deficits [Sensation] : the sensory exam was normal to light touch and pinprick [Motor Exam] : the motor exam was normal [Oriented To Time, Place, And Person] : oriented to person, place, and time [Affect] : the affect was normal [Mood] : the mood was normal [Not Anxious] : not anxious [No Palpable Adenopathy] : no palpable adenopathy [Cervical Lymph Nodes Enlarged Posterior Bilaterally] : posterior cervical [Cervical Lymph Nodes Enlarged Anterior Bilaterally] : anterior cervical [Supraclavicular Lymph Nodes Enlarged Bilaterally] : supraclavicular [Axillary Lymph Nodes Enlarged Bilaterally] : axillary [Femoral Lymph Nodes Enlarged Bilaterally] : femoral [Inguinal Lymph Nodes Enlarged Bilaterally] : inguinal [Chaperone Present] : A chaperone was present in the examining room during all aspects of the physical examination [FreeTextEntry2] : Gildardo MCRAE

## 2024-11-07 NOTE — LETTER BODY
[FreeTextEntry1] : Dear Dr. Mark Ayala,  Thank you for referring your patient Edward Robles for consultation for fertility concerns.  I have requested several baseline blood studies and a semen analysis. I will see the patient back in followup shortly and make further recommendations. I have attached a copy of my consultation note for your records.  Thank you again for this kind referral. I will certainly keep you updated with further progress. Please do not hesitate to call me if you have any questions.  Best regards,    Jeff Briscoe M.D., PhD Professor of Urology    [facility] of [facility]  for , Reproductive and Sexual Medicine    [facility]

## 2024-11-07 NOTE — HISTORY OF PRESENT ILLNESS
[FreeTextEntry1] : The patient returns for follow-up to review his recent blood studies.  He has also stored to specimen for cryopreservation.  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  He is also positive for CMV IgG but negative for CMV IgG M.  His hepatitis B core antibody was also positive..  PMH: Patient is a 60-year-old gentleman with a history of metastatic melanoma who presents with the chief complaint of impaired fertility for evaluation. The patient denies fevers, chills, nausea and/or vomiting and no unexplained weight loss.   I reviewed the questionnaire he had completed with him in detail.  His partner, age 33, was not able to accompany him to the visit today ([facility]). She has not had any prior pregnancies. As I understand her evaluation has been normal to date. They have been trying to achieve pregnancy for the past 6 months without conception. This issue causes both he and his partner significant distress.   He has no known drug allergies.  His past medical history demonstrates no significant urologic issues (see patient questionnaire).  In his present occupation as a he has no known toxin exposure.  He does not smoke and drinks only socially.  He has no known drug allergies.  His review of systems is non-contributory. His family history is not significant. A chaperone was offered to be present for the examination but declined by the patient.

## 2024-11-07 NOTE — HISTORY OF PRESENT ILLNESS
[FreeTextEntry1] : The patient returns for follow-up to review his recent blood studies.  He has also stored to specimen for cryopreservation.  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  He is also positive for CMV IgG but negative for CMV IgG M.  His hepatitis B core antibody was also positive..  PMH: Patient is a 60-year-old gentleman with a history of metastatic melanoma who presents with the chief complaint of impaired fertility for evaluation. The patient denies fevers, chills, nausea and/or vomiting and no unexplained weight loss.   I reviewed the questionnaire he had completed with him in detail.  His partner, age 33, was not able to accompany him to the visit today ([facility]). She has not had any prior pregnancies. As I understand her evaluation has been normal to date. They have been trying to achieve pregnancy for the past 6 months without conception. This issue causes both he and his partner significant distress.   He has no known drug allergies.  His past medical history demonstrates no significant urologic issues (see patient questionnaire).  In his present occupation as a he has no known toxin exposure.  He does not smoke and drinks only socially.  He has no known drug allergies.  His review of systems is non-contributory. His family history is not significant. A chaperone was offered to be present for the examination but declined by the patient.

## 2024-11-07 NOTE — LETTER BODY
[FreeTextEntry1] : Dear Dr. Mark Ayala,  Thank you for referring your patient Edward Robles for consultation for fertility concerns.  I have requested several baseline blood studies and a semen analysis. I will see the patient back in followup shortly and make further recommendations. I have attached a copy of my consultation note for your records.  Thank you again for this kind referral. I will certainly keep you updated with further progress. Please do not hesitate to call me if you have any questions.  Best regards,    Jeff Briscoe M.D., PhD Professor of Urology    [facility] [employer] of [facility]  for , Reproductive and Sexual Medicine    [facility]

## 2024-11-07 NOTE — DISCUSSION/SUMMARY
[de-identified] : Options reviewed, [place] shoe wear / RBS, activity modification, calf stretching exercises and HEP, MRI assessment rule-out degenerative tear.  Return to office in 2 - 3 weeks / PRN, advanced imaging study review TC.  All questions answered.

## 2024-11-07 NOTE — PHYSICAL EXAM
[Normal] : Oriented to person, place, and time, insight and judgement were intact and the affect was normal [de-identified] : Extremity: +equinus (releases) B LE, extensor substitution and nontender dorsal extensors R LE, mild tenderness dorsal capsular second MTP joint R forefoot. Nontender B ankles, Achilles, syndesmosis, peroneals, hindfoot ST, midfoot LF and remainder of forefoot. Stable Drawer testing B ankles, 5 / 5 evertor strength B ankles, calves soft and nontender, sensorimotor as aforementioned, skin intact B LE.  AOx3, mood / affect normal. [de-identified] : BEV, NAD [de-identified] : Radiographs (3v R foot) reveal PTS R foot, Love's R foot, bipartite medial and lateral sesamoids R forefoot, tiny plantar calcaneal enthesophyte R hindfoot.

## 2024-11-07 NOTE — HISTORY OF PRESENT ILLNESS
[Sneakers] : cathy [FreeTextEntry1] : Follow-up extensor tendinitis reports doing well save for subacute onset (approximately 2 months ago) of pain referable to second MTP joint region R forefoot.  Denies additional musculoskeletal complaints referable to foot / ankle.

## 2024-11-07 NOTE — ASSESSMENT
[FreeTextEntry1] : The patient returns for follow-up to review his recent blood studies.  He has also stored to specimen for cryopreservation.  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  He is also positive for CMV IgG but negative for CMV IgG M.  His hepatitis B core antibody was also positive..I drew a RPR with confirmation today.  His hematocrit was 47%, he had dyslipidemia and medical evaluation was suggested.  His LH was 6.4 mIUs/mL and FSH 9.1 mIUs/mL.  His testosterone total was 318 ng/dL his TSH was 2.1 IU/mL, prolactin 9.8 ng/mL, estradiol 41.7 pg/mL.   Semen analysis demonstrated very low number of sperm with poor motility and morphology.  IVF with single sperm injection would be the best options for him.  Consultation: 35 minutes which excludes teaching and separately reported service but includes reviewing his history and discussing prior results, discussing various treatment options, writing medication prescriptions, requests for lab testing and writing his note. There was also additional time in preparing for the visit.

## 2024-11-19 NOTE — HISTORY OF PRESENT ILLNESS
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 Punch Biopsy on 3/2019. Excisional biopsy taken of lesion 9/13/2019. Wide local excision 11/13/2020: Pathology: Consistent with melanoma. Axilla lymph node: 1/1 consistent with melanoma. Thickness, ulceration status and IHC are not available at this time.  CT chest 9/2019: No evidence of metastatic disease 12/23/2020 CT head, chest and abdomen performed in the [facility]: CT head negative for metastatic disease; CT chest demonstrates an increase in the size of the previously seen 8x5 mm nodule in the upper outer right anterior chest which currently measures 14x10 mm.; CT abdomen no evidence of metastatic disease. CT chest - RLL 6 mm and LLL 3 mm (compared with CT 9/2019)  3/2/2021 Patient underwent Lung RLL wedge resection for an increasing lung nodule, pathology consistent with metstatsic melanoma   3/30/2021 Patient was started on Nivolumab for  metastatic melanoma:  CT chest 3/30/2021: In comparison with 12/9/2021, status post interval right lower lobe wedge resection. 6 mm right lower lobe nodule (series 2 image 96) is enlarged; previously 2 mm. The remaining 8 mm and smaller lung nodules annotated on series 2 are stable. Difficult to delineate asymmetric soft tissue density lateral to the right pectoral muscle (series 2 image 38) is not significantly changed in size but contains a new small focus of hypodensity (series 601B image 74), possibly postprocedural seroma.  9/18/2024 MRI Brain showed No evidence of brain metastases  9/18/2024 PET/CT showed FDG avid lesions as described above including gallbladder fossa and right T7 vertebral body  10/10/2024 MRI Thoracic spine Indeterminate enhancing T1 hypointense lesion within the right aspect of the T7 vertebral body, which exhibits FDG avidity on prior PET/CT dated 9/18/2024. No significant FDG avidity was seen in this region on prior PET/CT dated 2/9/2021. This lesion is suspicious for metastatic disease within the T7 vertebral body Indeterminate STIR hyperintense, possibly enhancing focus within the T10 vertebral body, measuring up to 3 mm, which may represent a tiny hemangioma versus metastatic disease within the T10 vertebral body. Attention on follow-up imaging is recommended.  10/18/2024 CT chest/abdomen/pelvis New hepatic segment 5 lesion which correlates with FDG avid focus seen in PET/CT, concerning for metastasis. There is a new lytic lesion in the T7 vertebral body which correlates with FDG avid focus seen in PET/CT, also concerning for metastasis.  10/21/24 Patient presents to discuss the role of radiation therapy in his care. Denies pain. Generally doing well.   11/19/24 OTV Completed tx.1/1 Patient completed treatment.  Feeling well, denies pain.  All questions answered.  Discharge folder provided for patient with f/u apt. Patient advised not to do heavy lifting for 2-3weeks.

## 2024-11-19 NOTE — HISTORY OF PRESENT ILLNESS
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 Punch Biopsy on 3/2019. Excisional biopsy taken of lesion 9/13/2019. Wide local excision 11/13/2020: Pathology: Consistent with melanoma. Axilla lymph node: 1/1 consistent with melanoma. Thickness, ulceration status and IHC are not available at this time.  CT chest 9/2019: No evidence of metastatic disease 12/23/2020 CT head, chest and abdomen performed in the [facility]: CT head negative for metastatic disease; CT chest demonstrates an increase in the size of the previously seen 8x5 mm nodule in the upper outer right anterior chest which currently measures 14x10 mm.; CT abdomen no evidence of metastatic disease. CT chest - RLL 6 mm and LLL 3 mm (compared with CT 9/2019)  3/2/2021 Patient underwent Lung RLL wedge resection for an increasing lung nodule, pathology consistent with metstatsic melanoma   3/30/2021 Patient was started on Nivolumab for  metastatic melanoma:  CT chest 3/30/2021: In comparison with 12/9/2021, status post interval right lower lobe wedge resection. 6 mm right lower lobe nodule (series 2 image 96) is enlarged; previously 2 mm. The remaining 8 mm and smaller lung nodules annotated on series 2 are stable. Difficult to delineate asymmetric soft tissue density lateral to the right pectoral muscle (series 2 image 38) is not significantly changed in size but contains a new small focus of hypodensity (series 601B image 74), possibly postprocedural seroma.  9/18/2024 MRI Brain showed No evidence of brain metastases  9/18/2024 PET/CT showed FDG avid lesions as described above including gallbladder fossa and right T7 vertebral body  10/10/2024 MRI Thoracic spine Indeterminate enhancing T1 hypointense lesion within the right aspect of the T7 vertebral body, which exhibits FDG avidity on prior PET/CT dated 9/18/2024. No significant FDG avidity was seen in this region on prior PET/CT dated 2/9/2021. This lesion is suspicious for metastatic disease within the T7 vertebral body Indeterminate STIR hyperintense, possibly enhancing focus within the T10 vertebral body, measuring up to 3 mm, which may represent a tiny hemangioma versus metastatic disease within the T10 vertebral body. Attention on follow-up imaging is recommended.  10/18/2024 CT chest/abdomen/pelvis New hepatic segment 5 lesion which correlates with FDG avid focus seen in PET/CT, concerning for metastasis. There is a new lytic lesion in the T7 vertebral body which correlates with FDG avid focus seen in PET/CT, also concerning for metastasis.  10/21/24 Patient presents to discuss the role of radiation therapy in his care. Denies pain. Generally doing well.   11/19/24 OTV Completed tx.1/1 Patient completed treatment.  Feeling well, denies pain.  All questions answered.  Discharge folder provided for patient with f/u apt. Patient advised not to do heavy lifting for 2-3weeks.

## 2024-11-19 NOTE — REVIEW OF SYSTEMS
[Negative] : Allergic/Immunologic [Fatigue: Grade 1 - Fatigue relieved by rest] : Fatigue: Grade 1 - Fatigue relieved by rest

## 2024-11-19 NOTE — DISEASE MANAGEMENT
[Clinical] : TNM Stage: c [IV] : IV [TTNM] : x [NTNM] : x [MTNM] : 1c [de-identified] : 9698 [de-identified] : 9778 [de-identified] : Spine FSRS T7

## 2024-11-19 NOTE — DISEASE MANAGEMENT
[Clinical] : TNM Stage: c [IV] : IV [TTNM] : x [NTNM] : x [MTNM] : 1c [de-identified] : 9690 [de-identified] : 9305 [de-identified] : Spine FSRS T7

## 2024-11-25 NOTE — HISTORY OF PRESENT ILLNESS
[de-identified] : Mr. Robles is a 56 year old gentlemen with no significant past medical history presenting to the office for an initial consultation of melanoma.  Patient has been in the [facility] for 6 years. 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 Lived in most of [place] for over 20 years.  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 Has been an athlete for many decades. He is vegan. He swims a up to a couple miles daily.  He is open to complementary medicine, and meditation.   Patient noted development of a solitary hyperpigmented plaque over the right side of chest, which was gradually increasing in diameter with a slight change in color. Patient has history of significant sun exposure and was not fond of using sun protection. Punch Biopsy on 3/2019. Excisional biopsy taken of lesion 9/13/2019. Wide local excision 11/13/2020: Pathology: Consistent with melanoma. Axilla lymph node: 1/1 consistent with melanoma. Thickness, ulceration status and IHC are not available at this time.  CT chest 9/2019: No evidence of metastatic disease 12/23/2020 CT head, chest and abdomen performed in the [facility]: CT head negative for metastatic disease; CT chest demonstrates an increase in the size of the previously seen 8x5 mm nodule in the upper outer right anterior chest which currently measures 14x10 mm.; CT abdomen no evidence of metastatic disease. CT chest - RLL 6 mm and LLL 3 mm (compared with CT 9/2019).   2/19/21: I discussed PET scan and CT with radiologist. Lung lesions too small to confirm melanoma by CT biopsy. Thoracic surgeon would need to be consulted. I called Dr. Woodard, and he believes that the lesion in the right lung is accessible surgically. Also he thinks that there is a possibility of infection as opposed to melanoma. Patient is aware of this and is making an appointment. Patient slides obtained, and confirmed Stage 3b. We discussed that if the lungs are negative for melanoma, then would recommend 1 year adjuvant Opdivo as adjuvant.  3/12/2021 Patient here to review pathology, labs, films. He feels well and is able to play tennis  3/30/2021: 1) metastatic melanoma: Patient is here for C1 NIvolumab.He is feeling well physically and psychologically and ready to start treatment.  We re-reviewed logistics, mechanism of action and most common irAEs from Nivolumab. CT chest 3/30/2021: In comparison with 12/9/2021, status post interval right lower lobe wedge resection. 6 mm right lower lobe nodule (series 2 image 96) is enlarged; previously 2 mm. The remaining 8 mm and smaller lung nodules annotated on series 2 are stable. Difficult to delineate asymmetric soft tissue density lateral to the right pectoral muscle (series 2 image 38) is not significantly changed in size but contains a new small focus of hypodensity (series 601B image 74), possibly postprocedural seroma. 2) COVID-19 infection: Patient was tested positive for COVID-19 on 3/16.  He is asymptomatic and denies fever, chills, change in taste/smell, cough, SOB , diarrhea or fatigue. 3) Vaccine: Rah and Rah vaccine was given on 3/25/2021.   4/14/2021: 1) Metastatic melanoma: Patient is here for toxicity evaluation s/p one cycle of Nivolumab on 3/30/2021.  Patient did well and reports no significant irAEs.  Denies fever, chills, fatigue, arthralgia, myalgia, cough, rash, diarrhea, cough or chest pain. 2) Facial burning: Patient underwent Blue light procedure on 4/13/2021 with Dermatology.  He noticed "burning" on his face a couple of hour after his procedure which is causing some irritation.  He is scheduled to be evaluated by dermatology today.  5/11/2021: Metastatic melanoma: Patient is here for toxicity evaluation s/p two cycle of Nivolumab. Patient did well and reports no significant irAEs.  Denies fever, chills, fatigue, arthralgia, myalgia, cough, rash, diarrhea, cough or chest pain. COVID side effects have improved and completely resolved. No irAEs Has started swimming again [employer] after C#4  5/25/2021: 1) melanoma: Patient is here for C3 Nivolumab.  He is doing well and reports no significant irAEs.   Denies fever, chills, fatigue, arthralgia, myalgia, cough, rash, diarrhea, cough or chest pain. Patient admits to grade one fatigue which he is able to push through.  No restrictions with his ADLs.  He is able to swim. We reviewed labs, no acute abnormality noted.  WIll recheck TSH levels today.  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 patient had covid and is feeling better but not at baseline - he is able to swim long distances but notes he gets fatigued at times when walks distances Due for CT scan next week.  9/14/2021: 1) Melanoma: Patient is here for C7 Nivolumab. Patient is doing well on treatment and voices no new complaints. Denies any significant irAEs. Denies fever, chills,  cough, SOB, arthralgia, myalgia, diarrhea. 2) checkpoint inhibitor induced dermatitis, : Has improved. Currently not using any ointments or cream.  Patient has been moisturizer. 3) Hypothyroid: Patient was evaluated by Endocrine, Dr. Martell who recommended Levothyroxine 125mcg Her thyroid functions are improving  4) Social: Patient is currently living at apartment close to [facility] house. He is currently not working.  11/9/2021: 1) Melanoma: Patient is here for C9 Nivolumab. Patient is doing well on treatment and voices no new complaints. Denies any significant irAEs today.  Denies fever, chills, cough, SOB, arthralgia, myalgia, diarrhea. 2) checkpoint inhibitor induced dermatitis: Has improved on phototherapy & topical steroids not currently using any ointments or cream.  Patient has been moisturizer. 3) Hypothyroid: Patient was evaluated by Endocrine, Dr. Martell who recommended Levothyroxine 137 mcg Her thyroid functions are improving. 4) Right upper quadrant pain: Patient admits to pain in the right lateral abdominal area x few months. Will have radiology review imaging from 10/2021. Appears to be musculoskeletal in nature.   1) Melanoma:  C12 Nivolumab. He is tolerating treatment well and reports no significant irAEs. Labs reviewed with patient on 1/4/2022, no acute abnormality. CT chest, abdomen/pelvis ordered. He has no restrictions with his ADLs. He is now ice skating. Diet and weight remain stable. He is being followed by Dr. 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  Currently on Levothyroxine 137 mcg.  3/1/2022 1)Melanoma C13 Nivolumab today. He is tolerating treatment extremely well and reports no significant irAEs. Patient underwent CT chest, abdomen/pelvis on 2/15/2022 which demonstrated the previously noted right lower lobe nodule no longer visualized. Otherwise, stable exam. He was evaluated by endocrine for hypothyroid, patient is now on Levothyroxine 150 mcg daily. He has no restrictions with his ADLs. Patient admits to grade one fatigue but is able to push through . He is able to swim ~ 2 miles/day.  Continues UV q 2 weeks, and will discuss stopping it. Notes dry mouth and taste.  3/29/2022: C14 Nivolumab today. He is tolerating treatment relatively well and reports no significant irAEs. He has no restrictions with his ADLs. Labs reviewed and LFT's are trending upwards. Grade one fatigue but is able to push through. He is currently on Levothyroxine 150 mcg daily and doing well. He is adjusting to the dose and will not start Cytomel at this time. Due to dry mouth Sjogren syndrome labs were ordered however not performed last visit, and will do so this time. He does note improved taste after a course of clotrimazole.  4/26/2022: C15 Nivolumab today. Labs reviewed, no acute abnormality. Thyroid functions remain WNL. Sjogren labs performed due to dry mouth, labs WNL. Tolerating treatment extremely well and reports on significant irAEs. Danny (Signatera) performed on 3/1/2022: Signatera Positive ; MTM/mL: 0.65  5/24/22 Patient will have C16 nivolumab. Repeat ct DNA next cycle. If positive, get PET. If negative, consider a few more treatments and stop or complete 2 year course. Thyroid issue seems to be addressed. Phototherapy is now once a month. Had stopped and then restarted due to increase in dermatitis. Mental state is positive. Remains underactive in terms of exercise. Has been supporting his father and family with medical issues.  7/19/2022 C18 Nivolumab today. Patient is tolerating treatment well and reports no significant irAEs. Patient is no longer receiving light therapy from dermatology. Patient is currently on Levothyroxine from 150 mcg to 137 mcg. Admits to grade one fatigue intermittently but has improved. He is now back to baseline. He continues to swim and has no restrictions with his ADLs.  8/16/22 C19 Nivolumab Doing well overall. Lisa has converted to negative on 7/19/22 Will continue CT scan q 4 months. Needs full TFTs today. Able to swim for long periods of time. Stopped phototherapy for  checkpoint inhibitor induced dermatitis. His only complaint is ear scaling.  9/15/2022: C20 Nivolumab on 9/13/2022 He is doing well on treatment and voices no major irAEs. Grade one fatigue but is able to push through. We reviewed his CT chest, abdomen/pelvis on 8/2022. Next CT 12/2022. Lisa on draw#2 negative on 7/29/2022 Next draw 10/2022.  10/07/2022 S/P C20 Nivolumab on 09/13/2022, and next cycle will be on 10/11/2022 He c/o pain to left side lower back. Also, he has some fatigue. His ear infection has cleared with use of medicated drops. CT-Scan of chest of 8/25/2022 showed slight increase of right chest wall nodule. Will get US and possible biopsy of nodule to r/o abnormal pathology.  11/08/2022: C22 Nivolumab today. US biopsy of chest wall negative on 10/2022. Patient is feeling well today and voices no major irAEs. Admits to grade one fatigue intermittently however is able to push through. Danny franklin D#3 today. Intermittent rash on his lower back and forehead.  He is using Triamcinolone on his lower back and Metronidazole on his forehead.  12/6/22 Patient here for C23 Will complete one more dose and then start surveillance with scans and ct-DNA q 3 months. Will obtain lab monitoring at home. He dose still have fatigue and thinks that it is related to low testosterone. Has not been sexually active.  01/03/2023 C24 Nivolumab today. Lisa Delgado D#3 on 11/2022 negative. D#4 02/2023 CT chest, abdomen/pelvis on 12/15/2022 which revealed no evidence of recurrent and/or metastatic disease. He is tolerating treatment relatively well and reports no major irAEs. Labs reviewed. Hormones are WNL.  01/31/2023: C25 Nivolumab today. Will treat for 26 cycles and stop. No major irAEs. D#4 Signatera on 01/03/2023 negative. He is doing well and voices no major complaints. Patient has gained weight while on treatment. Has not been exercising as often.  02/28/2023 C26 Nivolumab today (last treatment) No major irAEs. CT chest, abdomen/pelvis on 03/02/2023. He was seen by cardiology (Dr. Bingham) due to CORLEY. He underwent ECG and TTE which revealed no acute abnormality. His EF is 59%, normal LV systolic function. His exercise induced stress test did not reveal any acute abnormality. He was advised to undergo CT chest (calcium score) and will schedule.  07/07/2023 He is here for his follow up for metastatic melanoma. He was seen by Dr. Zaragoza (Ortho) for bilateral foot pain, R>L and swelling x 3 weeks. Patient has been doing PT and home stretch exercises which has been helping. His last signatera on 04/2023 was negative. Will repeat today. Last CT on 03/2023 stable. Next CT on 09/2023. Patient continues to have issues with his energy level. He is resting more and not as active however has no restrictions with his routine/instrumental ADLs. Patient has gained weight.  3/7/2024 Signatera Danny 2/19/2024 = 0.00 CT 2/21/2024 LUNGS AND LARGE AIRWAYS: Patent central airways. Stable submillimeter right upper lobe pulmonary nodule on image 27 of series 303. Left lower lobe tiny nodules bilaterally seen on the current exam. Patient is again noted to be post right lower lobe wedge resection PLEURA: No pleural effusion. VESSELS: Within normal limits. HEART: Heart size is normal. No pericardial effusion. MEDIASTINUM AND BRIDGER: No lymphadenopathy. CHEST WALL AND LOWER NECK: Scattered subcutaneous nodules similar in appearance to prior study. Representative nodule on image 29 of series 303 measures 7 mm and is unchanged. Right axillary scarring is also unchanged. Right sided mild gynecomastia is stable as well. LIVER: Within normal limits. BILE DUCTS: Normal caliber. GALLBLADDER: Within normal limits. SPLEEN: Unremarkable PANCREAS: Within normal limits. ADRENALS: Within normal limits. KIDNEYS/URETERS: Within normal limits. BLADDER: Minimally distended. REPRODUCTIVE ORGANS: Prostate within normal limits. BOWEL: No bowel obstruction. Appendix is normal. A small hiatal hernia is seen with mild colonic diverticulosis without diverticulitis PERITONEUM: No ascites VESSELS: Within normal limits. RETROPERITONEUM/LYMPH NODES: No lymphadenopathy. Prominent bilateral inguinal lymph nodes are unchanged. ABDOMINAL WALL: 2 small fat-containing inguinal hernias. Small midline fat-containing ventral hernia. BONES: Degenerative changes. IMPRESSION: Stable examination.  Feeling well and voices no new complaints. Addressed all questions. Looking to lose some weight. Plans to go to the [facility]. He is looking to take care of his parents more here, and work with [facility] remotely.  7/1/24 Last Signatera Danny on 6/12 is not detected. Will repeat in October 2024. Feeling well and voices no major complaints. 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  10/3/2024 Patient with h/o melanoma just in lungs. He was on treatment for 2 years. Had imaging done at [facility] ([place] I had it loaded. Will ask radiology to review the PET - there is a liver and spine T7 lesion. Had rise of Signatera.  10/23/24 He is for C1 Ipilimumab (3) + Nivolumab (1) Was evaluated by Dr. Jeff Briscoe (Urology) for sperm banking. Patient wanted to discuss his labs prior to receiving therapy. He was educated no contraindications to receive therapy with positive Hb AB. He was positive in the past (2/2021) and had negative antigens. He was told his syphillis titers were positive. Unable to located in his chart. Requested labs be sent over to our team. He will need to be seen one week prior to C2 for toxicity evaluation and labs.  11/6/24 Patient is here for toxicity evaluation s/p 1 cycle of Ipilimumab (3) + Nivolumab (1). Did well and voices no major irAEs. Mild headache one day which has subsided Labs today. 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 Mild fatigue but is able to push through.  11/25/24 S/P 2 cycles of Ipi 3 + Nivo 1 on 11/13. C3 is scheduled for 12/4. Patient completed SRS to T spine (T7) in 1 fraction on 11/19/24. Labs today. Doing well, no major irAEs. Admits to soreness in the T7 region after radiation. No major pain. Admits to dry mouth and was given Clotrimazole with relief.   [de-identified] : Surgical Oncology: Santos Seals\par  Thoracic Surgeon: Gatito Woodard \par  \par  Pt's sister Yohana Baca is best contact:  [phone]\par  \par  Patient sister home: [phone]\par  Patient new cell: [phone]\par  Back up number - [phone]

## 2024-11-25 NOTE — ASSESSMENT
[FreeTextEntry1] : Patient has melanoma on right chest resected 11/2020. A positive right axillary node also found. On CT scan 12/2020 there are 2 subcentimeter nodules that are reported to be new since 9/2019. Patient has returned from [facility] for further evaluation and treatment. This is considered Stage 3b. PET and CT are reviewed today. The CT shows nodules, and patient underwent biopsy with Dr Woodard.   Nivolumab  C1 3/30/2021. C26 02/28/2023 - last treatment.  Danny (Signatera) performed on 3/1/2022: Signatera Positive had converted to negative on 7/19/2022.  Then it turned positive again August 29, 2024 ctDNA = 10.11  Patient with h/o melanoma just in lungs. He was on treatment for 2 years. Had imaging done at [facility]. I had it loaded. Will ask radiology to review the PET - there is a liver and spine T7 lesion. Had rise of Signatera.  IPI 3 + NIVO 1 C1D1 10/23 C2 11/13 C3 scheduled for 12/4  Patient is here for toxicity evaluation s/p 2 cycle of Ipilimumab (3) + Nivolumab (1).  Did well and voices no major irAEs. Mild headache one day which has subsided  He completed SBRT to T7 on 11/19 x one fraction. F/U with Dr. Sanchez on 1/2/25  labs today.  f/u one week prior to C4 for labs and examination.  Dr. Ayala agrees with above plan.

## 2024-12-19 NOTE — ASSESSMENT
[FreeTextEntry1] : Patient has melanoma on right chest resected 11/2020. A positive right axillary node also found. On CT scan 12/2020 there are 2 subcentimeter nodules that are reported to be new since 9/2019. Patient has returned from [facility] for further evaluation and treatment. This is considered Stage 3b. PET and CT are reviewed today. The CT shows nodules, and patient underwent biopsy with Dr Woodard.   Nivolumab  C1 3/30/2021. C26 02/28/2023 - last treatment.  Danny (Signatera) performed on 3/1/2022: Signatera Positive had converted to negative on 7/19/2022.  Then it turned positive again August 29, 2024 ctDNA = 10.11  Patient with h/o melanoma just in lungs. He was on treatment for 2 years. Had imaging done at [facility]. I had it loaded. Will ask radiology to review the PET - there is a liver and spine T7 lesion. Had rise of Signatera.  IPI 3 + NIVO 1 C1D1 10/23 C2 11/13 C3 12/4 C4 scheduled for 12/26  Patient is here for toxicity evaluation s/p 3 cycle of Ipilimumab (3) + Nivolumab (1).  C4 is scheduled for 12/26/24.  Headache: Patient endorses headache in the back of his head and frontal lobe. It has been more consistent over the last 5-7 days. No triggering factors a/w pain. No trauma to area. Pain is very mild and abates without acute intervention. Does not wake him up in the middle of the night. No change in mental status. No history of migraines in the past. No fever or upper respiratory symptoms.  Will order labs to r/o immune mediated endocrinopathies. If pain is more constant and increases in intensity will need brain imaging.  Will require CT to assess for response vs POD. Ordered for 1st week of January 2025  f/u q cycle on maintenance Nivolumab.  Dr. Ayala agrees with above plan.

## 2024-12-19 NOTE — HISTORY OF PRESENT ILLNESS
[FreeTextEntry1] : The patient-doctor. relationship has been established in a face-to-face fashion on real-time video audio HIPAA compliant communication using telemedicine software. The patient, Edward Robleswas at home and participated in the telephonic visit with the Physician Jeff Briscoe MD PhD who was in his medical office. The patient's identity has been confirmed.  The patient was previously emailed a copy of the telemedicine consent.  The patient has had a chance to review and has now given verbal consent and has requested care to be assessed and treated through telemedicine. The patient understands there may be limitations in this process and that they need not need further follow-up care in the office and/or hospital settings.   Verbal consent was given on Thursday, December 19, 2024 at 9 AM by the patient.  It was requested by the physician.  A written consent was previously sent for the patient to sign and return.  The patient returns for follow-up.  He has also stored to specimen for cryopreservation.  His blood studies have demonstrated a positive for treponema palladium antibody and a negative RPR and confirmatory testing.  He is also positive for CMV IgG but negative for CMV IgG M.  His hepatitis B core antibody was also positive..  PMH: Patient is a 60-year-old gentleman with a history of metastatic melanoma who presents with the chief complaint of impaired fertility for evaluation. The patient denies fevers, chills, nausea and/or vomiting and no unexplained weight loss.   I reviewed the questionnaire he had completed with him in detail.  His partner, age 33, was not able to accompany him to the visit today ([facility]). She has not had any prior pregnancies. As I understand her evaluation has been normal to date. They have been trying to achieve pregnancy for the past 6 months without conception. This issue causes both he and his partner significant distress.   He has no known drug allergies.  His past medical history demonstrates no significant urologic issues (see patient questionnaire).  In his present occupation as a he has no known toxin exposure.  He does not smoke and drinks only socially.  He has no known drug allergies.  His review of systems is non-contributory. His family history is not significant. A chaperone was offered to be present for the examination but declined by the patient.

## 2024-12-19 NOTE — HISTORY OF PRESENT ILLNESS
[FreeTextEntry1] : The patient-doctor. relationship has been established in a face-to-face fashion on real-time video audio HIPAA compliant communication using telemedicine software. The patient, Edward Robleswas at home and participated in the telephonic visit with the Physician Jeff Briscoe MD PhD who was in his medical office. The patient's identity has been confirmed.  The patient was previously emailed a copy of the telemedicine consent.  The patient has had a chance to review and has now given verbal consent and has requested care to be assessed and treated through telemedicine. The patient understands there may be limitations in this process and that they need not need further follow-up care in the office and/or hospital settings.   Verbal consent was given on Thursday, December 19, 2024 at 9 AM by the patient.  It was requested by the physician.  A written consent was previously sent for the patient to sign and return.  The patient returns for follow-up.  He has also stored to specimen for cryopreservation.  His blood studies have demonstrated a positive for treponema palladium antibody and a negative RPR and confirmatory testing.  He is also positive for CMV IgG but negative for CMV IgG M.  His hepatitis B core antibody was also positive..  PMH: Patient is a 60-year-old gentleman with a history of metastatic melanoma who presents with the chief complaint of impaired fertility for evaluation. The patient denies fevers, chills, nausea and/or vomiting and no unexplained weight loss.   I reviewed the questionnaire he had completed with him in detail.  His partner, age 33, was not able to accompany him to the visit today ([facility]). She has not had any prior pregnancies. As I understand her evaluation has been normal to date. They have been trying to achieve pregnancy for the past 6 months without conception. This issue causes both he and his partner significant distress.   He has no known drug allergies.  His past medical history demonstrates no significant urologic issues (see patient questionnaire).  In his present occupation as a he has no known toxin exposure.  He does not smoke and drinks only socially.  He has no known drug allergies.  His review of systems is non-contributory. His family history is not significant. A chaperone was offered to be present for the examination but declined by the patient.

## 2024-12-19 NOTE — HISTORY OF PRESENT ILLNESS
[de-identified] : Mr. Robles is a 56 year old gentlemen with no significant past medical history presenting to the office for an initial consultation of melanoma.  Patient has been in the [facility] for 6 years. 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 Lived in most of [place] for over 20 years.  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 Has been an athlete for many decades. He is vegan. He swims a up to a couple miles daily.  He is open to complementary medicine, and meditation.   Patient noted development of a solitary hyperpigmented plaque over the right side of chest, which was gradually increasing in diameter with a slight change in color. Patient has history of significant sun exposure and was not fond of using sun protection. Punch Biopsy on 3/2019. Excisional biopsy taken of lesion 9/13/2019. Wide local excision 11/13/2020: Pathology: Consistent with melanoma. Axilla lymph node: 1/1 consistent with melanoma. Thickness, ulceration status and IHC are not available at this time.  CT chest 9/2019: No evidence of metastatic disease 12/23/2020 CT head, chest and abdomen performed in the [facility]: CT head negative for metastatic disease; CT chest demonstrates an increase in the size of the previously seen 8x5 mm nodule in the upper outer right anterior chest which currently measures 14x10 mm.; CT abdomen no evidence of metastatic disease. CT chest - RLL 6 mm and LLL 3 mm (compared with CT 9/2019).   2/19/21: I discussed PET scan and CT with radiologist. Lung lesions too small to confirm melanoma by CT biopsy. Thoracic surgeon would need to be consulted. I called Dr. Woodard, and he believes that the lesion in the right lung is accessible surgically. Also he thinks that there is a possibility of infection as opposed to melanoma. Patient is aware of this and is making an appointment. Patient slides obtained, and confirmed Stage 3b. We discussed that if the lungs are negative for melanoma, then would recommend 1 year adjuvant Opdivo as adjuvant.  3/12/2021 Patient here to review pathology, labs, films. He feels well and is able to play tennis  3/30/2021: 1) metastatic melanoma: Patient is here for C1 NIvolumab.He is feeling well physically and psychologically and ready to start treatment.  We re-reviewed logistics, mechanism of action and most common irAEs from Nivolumab. CT chest 3/30/2021: In comparison with 12/9/2021, status post interval right lower lobe wedge resection. 6 mm right lower lobe nodule (series 2 image 96) is enlarged; previously 2 mm. The remaining 8 mm and smaller lung nodules annotated on series 2 are stable. Difficult to delineate asymmetric soft tissue density lateral to the right pectoral muscle (series 2 image 38) is not significantly changed in size but contains a new small focus of hypodensity (series 601B image 74), possibly postprocedural seroma. 2) COVID-19 infection: Patient was tested positive for COVID-19 on 3/16.  He is asymptomatic and denies fever, chills, change in taste/smell, cough, SOB , diarrhea or fatigue. 3) Vaccine: Rah and Rah vaccine was given on 3/25/2021.   4/14/2021: 1) Metastatic melanoma: Patient is here for toxicity evaluation s/p one cycle of Nivolumab on 3/30/2021.  Patient did well and reports no significant irAEs.  Denies fever, chills, fatigue, arthralgia, myalgia, cough, rash, diarrhea, cough or chest pain. 2) Facial burning: Patient underwent Blue light procedure on 4/13/2021 with Dermatology.  He noticed "burning" on his face a couple of hour after his procedure which is causing some irritation.  He is scheduled to be evaluated by dermatology today.  5/11/2021: Metastatic melanoma: Patient is here for toxicity evaluation s/p two cycle of Nivolumab. Patient did well and reports no significant irAEs.  Denies fever, chills, fatigue, arthralgia, myalgia, cough, rash, diarrhea, cough or chest pain. COVID side effects have improved and completely resolved. No irAEs Has started swimming again [employer] after C#4  5/25/2021: 1) melanoma: Patient is here for C3 Nivolumab.  He is doing well and reports no significant irAEs.   Denies fever, chills, fatigue, arthralgia, myalgia, cough, rash, diarrhea, cough or chest pain. Patient admits to grade one fatigue which he is able to push through.  No restrictions with his ADLs.  He is able to swim. We reviewed labs, no acute abnormality noted.  WIll recheck TSH levels today.  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 patient had covid and is feeling better but not at baseline - he is able to swim long distances but notes he gets fatigued at times when walks distances Due for CT scan next week.  9/14/2021: 1) Melanoma: Patient is here for C7 Nivolumab. Patient is doing well on treatment and voices no new complaints. Denies any significant irAEs. Denies fever, chills,  cough, SOB, arthralgia, myalgia, diarrhea. 2) checkpoint inhibitor induced dermatitis, : Has improved. Currently not using any ointments or cream.  Patient has been moisturizer. 3) Hypothyroid: Patient was evaluated by Endocrine, Dr. Martell who recommended Levothyroxine 125mcg Her thyroid functions are improving  4) Social: Patient is currently living at apartment close to [facility] house. He is currently not working.  11/9/2021: 1) Melanoma: Patient is here for C9 Nivolumab. Patient is doing well on treatment and voices no new complaints. Denies any significant irAEs today.  Denies fever, chills, cough, SOB, arthralgia, myalgia, diarrhea. 2) checkpoint inhibitor induced dermatitis: Has improved on phototherapy & topical steroids not currently using any ointments or cream.  Patient has been moisturizer. 3) Hypothyroid: Patient was evaluated by Endocrine, Dr. Martell who recommended Levothyroxine 137 mcg Her thyroid functions are improving. 4) Right upper quadrant pain: Patient admits to pain in the right lateral abdominal area x few months. Will have radiology review imaging from 10/2021. Appears to be musculoskeletal in nature.   1) Melanoma:  C12 Nivolumab. He is tolerating treatment well and reports no significant irAEs. Labs reviewed with patient on 1/4/2022, no acute abnormality. CT chest, abdomen/pelvis ordered. He has no restrictions with his ADLs. He is now ice skating. Diet and weight remain stable. He is being followed by Dr. 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  Currently on Levothyroxine 137 mcg.  3/1/2022 1)Melanoma C13 Nivolumab today. He is tolerating treatment extremely well and reports no significant irAEs. Patient underwent CT chest, abdomen/pelvis on 2/15/2022 which demonstrated the previously noted right lower lobe nodule no longer visualized. Otherwise, stable exam. He was evaluated by endocrine for hypothyroid, patient is now on Levothyroxine 150 mcg daily. He has no restrictions with his ADLs. Patient admits to grade one fatigue but is able to push through . He is able to swim ~ 2 miles/day.  Continues UV q 2 weeks, and will discuss stopping it. Notes dry mouth and taste.  3/29/2022: C14 Nivolumab today. He is tolerating treatment relatively well and reports no significant irAEs. He has no restrictions with his ADLs. Labs reviewed and LFT's are trending upwards. Grade one fatigue but is able to push through. He is currently on Levothyroxine 150 mcg daily and doing well. He is adjusting to the dose and will not start Cytomel at this time. Due to dry mouth Sjogren syndrome labs were ordered however not performed last visit, and will do so this time. He does note improved taste after a course of clotrimazole.  4/26/2022: C15 Nivolumab today. Labs reviewed, no acute abnormality. Thyroid functions remain WNL. Sjogren labs performed due to dry mouth, labs WNL. Tolerating treatment extremely well and reports on significant irAEs. Danny (Signatera) performed on 3/1/2022: Signatera Positive ; MTM/mL: 0.65  5/24/22 Patient will have C16 nivolumab. Repeat ct DNA next cycle. If positive, get PET. If negative, consider a few more treatments and stop or complete 2 year course. Thyroid issue seems to be addressed. Phototherapy is now once a month. Had stopped and then restarted due to increase in dermatitis. Mental state is positive. Remains underactive in terms of exercise. Has been supporting his father and family with medical issues.  7/19/2022 C18 Nivolumab today. Patient is tolerating treatment well and reports no significant irAEs. Patient is no longer receiving light therapy from dermatology. Patient is currently on Levothyroxine from 150 mcg to 137 mcg. Admits to grade one fatigue intermittently but has improved. He is now back to baseline. He continues to swim and has no restrictions with his ADLs.  8/16/22 C19 Nivolumab Doing well overall. Lisa has converted to negative on 7/19/22 Will continue CT scan q 4 months. Needs full TFTs today. Able to swim for long periods of time. Stopped phototherapy for  checkpoint inhibitor induced dermatitis. His only complaint is ear scaling.  9/15/2022: C20 Nivolumab on 9/13/2022 He is doing well on treatment and voices no major irAEs. Grade one fatigue but is able to push through. We reviewed his CT chest, abdomen/pelvis on 8/2022. Next CT 12/2022. Lisa on draw#2 negative on 7/29/2022 Next draw 10/2022.  10/07/2022 S/P C20 Nivolumab on 09/13/2022, and next cycle will be on 10/11/2022 He c/o pain to left side lower back. Also, he has some fatigue. His ear infection has cleared with use of medicated drops. CT-Scan of chest of 8/25/2022 showed slight increase of right chest wall nodule. Will get US and possible biopsy of nodule to r/o abnormal pathology.  11/08/2022: C22 Nivolumab today. US biopsy of chest wall negative on 10/2022. Patient is feeling well today and voices no major irAEs. Admits to grade one fatigue intermittently however is able to push through. Danny franklin D#3 today. Intermittent rash on his lower back and forehead.  He is using Triamcinolone on his lower back and Metronidazole on his forehead.  12/6/22 Patient here for C23 Will complete one more dose and then start surveillance with scans and ct-DNA q 3 months. Will obtain lab monitoring at home. He dose still have fatigue and thinks that it is related to low testosterone. Has not been sexually active.  01/03/2023 C24 Nivolumab today. Lisa Delgado D#3 on 11/2022 negative. D#4 02/2023 CT chest, abdomen/pelvis on 12/15/2022 which revealed no evidence of recurrent and/or metastatic disease. He is tolerating treatment relatively well and reports no major irAEs. Labs reviewed. Hormones are WNL.  01/31/2023: C25 Nivolumab today. Will treat for 26 cycles and stop. No major irAEs. D#4 Signatera on 01/03/2023 negative. He is doing well and voices no major complaints. Patient has gained weight while on treatment. Has not been exercising as often.  02/28/2023 C26 Nivolumab today (last treatment) No major irAEs. CT chest, abdomen/pelvis on 03/02/2023. He was seen by cardiology (Dr. Bingham) due to CORLEY. He underwent ECG and TTE which revealed no acute abnormality. His EF is 59%, normal LV systolic function. His exercise induced stress test did not reveal any acute abnormality. He was advised to undergo CT chest (calcium score) and will schedule.  07/07/2023 He is here for his follow up for metastatic melanoma. He was seen by Dr. Zaragoza (Ortho) for bilateral foot pain, R>L and swelling x 3 weeks. Patient has been doing PT and home stretch exercises which has been helping. His last signatera on 04/2023 was negative. Will repeat today. Last CT on 03/2023 stable. Next CT on 09/2023. Patient continues to have issues with his energy level. He is resting more and not as active however has no restrictions with his routine/instrumental ADLs. Patient has gained weight.  3/7/2024 Signatera Danny 2/19/2024 = 0.00 CT 2/21/2024 LUNGS AND LARGE AIRWAYS: Patent central airways. Stable submillimeter right upper lobe pulmonary nodule on image 27 of series 303. Left lower lobe tiny nodules bilaterally seen on the current exam. Patient is again noted to be post right lower lobe wedge resection PLEURA: No pleural effusion. VESSELS: Within normal limits. HEART: Heart size is normal. No pericardial effusion. MEDIASTINUM AND BRIDGER: No lymphadenopathy. CHEST WALL AND LOWER NECK: Scattered subcutaneous nodules similar in appearance to prior study. Representative nodule on image 29 of series 303 measures 7 mm and is unchanged. Right axillary scarring is also unchanged. Right sided mild gynecomastia is stable as well. LIVER: Within normal limits. BILE DUCTS: Normal caliber. GALLBLADDER: Within normal limits. SPLEEN: Unremarkable PANCREAS: Within normal limits. ADRENALS: Within normal limits. KIDNEYS/URETERS: Within normal limits. BLADDER: Minimally distended. REPRODUCTIVE ORGANS: Prostate within normal limits. BOWEL: No bowel obstruction. Appendix is normal. A small hiatal hernia is seen with mild colonic diverticulosis without diverticulitis PERITONEUM: No ascites VESSELS: Within normal limits. RETROPERITONEUM/LYMPH NODES: No lymphadenopathy. Prominent bilateral inguinal lymph nodes are unchanged. ABDOMINAL WALL: 2 small fat-containing inguinal hernias. Small midline fat-containing ventral hernia. BONES: Degenerative changes. IMPRESSION: Stable examination.  Feeling well and voices no new complaints. Addressed all questions. Looking to lose some weight. Plans to go to the [facility]. He is looking to take care of his parents more here, and work with [facility] remotely.  7/1/24 Last Signatera Danny on 6/12 is not detected. Will repeat in October 2024. Feeling well and voices no major complaints. 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  10/3/2024 Patient with h/o melanoma just in lungs. He was on treatment for 2 years. Had imaging done at [facility]. I had it loaded. Will ask radiology to review the PET - there is a liver and spine T7 lesion. Had rise of Signatera.  10/23/24 He is for C1 Ipilimumab (3) + Nivolumab (1) Was evaluated by Dr. Jeff Briscoe (Urology) for sperm banking. Patient wanted to discuss his labs prior to receiving therapy. He was educated no contraindications to receive therapy with positive Hb AB. He was positive in the past (2/2021) and had negative antigens. He was told his syphillis titers were positive. Unable to located in his chart. Requested labs be sent over to our team. He will need to be seen one week prior to C2 for toxicity evaluation and labs.  11/6/24 Patient is here for toxicity evaluation s/p 1 cycle of Ipilimumab (3) + Nivolumab (1). Did well and voices no major irAEs. Mild headache one day which has subsided Labs today. 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 Mild fatigue but is able to push through.  11/25/24 S/P 2 cycles of Ipi 3 + Nivo 1 on 11/13. C3 is scheduled for 12/4. Patient completed SRS to T spine (T7) in 1 fraction on 11/19/24. Labs today. Doing well, no major irAEs. Admits to soreness in the T7 region after radiation. No major pain. Admits to dry mouth and was given Clotrimazole with relief.  12/19/24 Patient is here s/p 3 cycles of Ipi 3 and Nivo 1. C4 is scheduled for 12/26/24. Patient endorses headache in the back of his head and frontal lobe. It has been more consistent over the last 5-7 days. No triggering factors a/w pain. No trauma to area. Pain is very mild and abates without acute intervention. Does not wake him up in the middle of the night. No change in mental status. No history of migraines in the past.     [de-identified] : Surgical Oncology: Santos Seals\par  Thoracic Surgeon: Gatito Woodard \par  \par  Pt's sister Yohana Baca is best contact:  [phone]\par  \par  Patient sister home: [phone]\par  Patient new cell: [phone]\par  Back up number - [phone]

## 2024-12-19 NOTE — LETTER BODY
[FreeTextEntry1] : Dear Dr. Mark Ayala,  Thank you for referring your patient Edward Robles for consultation for fertility concerns.  I have requested several baseline blood studies and a semen analysis. I will see the patient back in followup shortly and make further recommendations. I have attached a copy of my consultation note for your records.  Thank you again for this kind referral. I will certainly keep you updated with further progress. Please do not hesitate to call me if you have any questions.  Best regards,    Jeff Briscoe M.D., PhD Professor of Urology    [facility] [employer] [facility]  for , Reproductive and Sexual Medicine    [facility]

## 2024-12-19 NOTE — ASSESSMENT
[FreeTextEntry1] : The patient returns for follow-up to review his recent blood studies.  He has also stored to specimen for cryopreservation.  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  He is also positive for CMV IgG but negative for CMV IgG M.  His hepatitis B core antibody was also positive. I have suggested he seek guidance from an infectious disease specialist. I have given him referrals.  His hematocrit was 47%, he had dyslipidemia and medical evaluation was suggested.  His LH was 6.4 mIUs/mL and FSH 9.1 mIUs/mL.  His testosterone total was 318 ng/dL his TSH was 2.1 IU/mL, prolactin 9.8 ng/mL, estradiol 41.7 pg/mL.   Semen analysis demonstrated very low number of sperm with poor motility and morphology.  IVF with single sperm injection would be the best options for him.  He will follow-up as needed.  Telehealth Consultation: 35 minutes reviewing his history and discussing prior results, discussing various treatment options, reviewing his recent lab testing and writing his note. There was also additional time in preparing for the visit and assisting the patient with technology issues he was having with the telehealth platform.

## 2024-12-19 NOTE — LETTER BODY
[FreeTextEntry1] : Dear Dr. Mark Ayala,  Thank you for referring your patient Edward Robles for consultation for fertility concerns.  I have requested several baseline blood studies and a semen analysis. I will see the patient back in followup shortly and make further recommendations. I have attached a copy of my consultation note for your records.  Thank you again for this kind referral. I will certainly keep you updated with further progress. Please do not hesitate to call me if you have any questions.  Best regards,    Jeff Briscoe M.D., PhD Professor of Urology    [facility] [employer] [facility]  for , Reproductive and Sexual Medicine    [facility]

## 2024-12-31 NOTE — HISTORY OF PRESENT ILLNESS
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 Punch Biopsy on 3/2019. Excisional biopsy taken of lesion 9/13/2019. Wide local excision 11/13/2020: Pathology: Consistent with melanoma. Axilla lymph node: 1/1 consistent with melanoma. Thickness, ulceration status and IHC are not available at this time.  CT chest 9/2019: No evidence of metastatic disease 12/23/2020 CT head, chest and abdomen performed in the [facility]: CT head negative for metastatic disease; CT chest demonstrates an increase in the size of the previously seen 8x5 mm nodule in the upper outer right anterior chest which currently measures 14x10 mm.; CT abdomen no evidence of metastatic disease. CT chest - RLL 6 mm and LLL 3 mm (compared with CT 9/2019)  3/2/2021 Patient underwent Lung RLL wedge resection for an increasing lung nodule, pathology consistent with metstatsic melanoma   3/30/2021 Patient was started on Nivolumab for  metastatic melanoma:  CT chest 3/30/2021: In comparison with 12/9/2021, status post interval right lower lobe wedge resection. 6 mm right lower lobe nodule (series 2 image 96) is enlarged; previously 2 mm. The remaining 8 mm and smaller lung nodules annotated on series 2 are stable. Difficult to delineate asymmetric soft tissue density lateral to the right pectoral muscle (series 2 image 38) is not significantly changed in size but contains a new small focus of hypodensity (series 601B image 74), possibly postprocedural seroma.  9/18/2024 MRI Brain showed No evidence of brain metastases  9/18/2024 PET/CT showed FDG avid lesions as described above including gallbladder fossa and right T7 vertebral body  10/10/2024 MRI Thoracic spine Indeterminate enhancing T1 hypointense lesion within the right aspect of the T7 vertebral body, which exhibits FDG avidity on prior PET/CT dated 9/18/2024. No significant FDG avidity was seen in this region on prior PET/CT dated 2/9/2021. This lesion is suspicious for metastatic disease within the T7 vertebral body Indeterminate STIR hyperintense, possibly enhancing focus within the T10 vertebral body, measuring up to 3 mm, which may represent a tiny hemangioma versus metastatic disease within the T10 vertebral body. Attention on follow-up imaging is recommended.  10/18/2024 CT chest/abdomen/pelvis New hepatic segment 5 lesion which correlates with FDG avid focus seen in PET/CT, concerning for metastasis. There is a new lytic lesion in the T7 vertebral body which correlates with FDG avid focus seen in PET/CT, also concerning for metastasis.  10/21/24 Patient presents to discuss the role of radiation therapy in his care. Denies pain. Generally doing well.   11/19/24 Completed planned RT to Spine T7 total dose 1800 cGy in 1 fraction.   11/21/24 MRI RT Foot IMPRESSION: Findings concerning for an evolving stress fracture within the second proximal phalanx  12/31/24 Patient presents today for follow up. Denies pain. Overall doing well.

## 2024-12-31 NOTE — HISTORY OF PRESENT ILLNESS
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 Punch Biopsy on 3/2019. Excisional biopsy taken of lesion 9/13/2019. Wide local excision 11/13/2020: Pathology: Consistent with melanoma. Axilla lymph node: 1/1 consistent with melanoma. Thickness, ulceration status and IHC are not available at this time.  CT chest 9/2019: No evidence of metastatic disease 12/23/2020 CT head, chest and abdomen performed in the [facility]: CT head negative for metastatic disease; CT chest demonstrates an increase in the size of the previously seen 8x5 mm nodule in the upper outer right anterior chest which currently measures 14x10 mm.; CT abdomen no evidence of metastatic disease. CT chest - RLL 6 mm and LLL 3 mm (compared with CT 9/2019)  3/2/2021 Patient underwent Lung RLL wedge resection for an increasing lung nodule, pathology consistent with metstatsic melanoma   3/30/2021 Patient was started on Nivolumab for  metastatic melanoma:  CT chest 3/30/2021: In comparison with 12/9/2021, status post interval right lower lobe wedge resection. 6 mm right lower lobe nodule (series 2 image 96) is enlarged; previously 2 mm. The remaining 8 mm and smaller lung nodules annotated on series 2 are stable. Difficult to delineate asymmetric soft tissue density lateral to the right pectoral muscle (series 2 image 38) is not significantly changed in size but contains a new small focus of hypodensity (series 601B image 74), possibly postprocedural seroma.  9/18/2024 MRI Brain showed No evidence of brain metastases  9/18/2024 PET/CT showed FDG avid lesions as described above including gallbladder fossa and right T7 vertebral body  10/10/2024 MRI Thoracic spine Indeterminate enhancing T1 hypointense lesion within the right aspect of the T7 vertebral body, which exhibits FDG avidity on prior PET/CT dated 9/18/2024. No significant FDG avidity was seen in this region on prior PET/CT dated 2/9/2021. This lesion is suspicious for metastatic disease within the T7 vertebral body Indeterminate STIR hyperintense, possibly enhancing focus within the T10 vertebral body, measuring up to 3 mm, which may represent a tiny hemangioma versus metastatic disease within the T10 vertebral body. Attention on follow-up imaging is recommended.  10/18/2024 CT chest/abdomen/pelvis New hepatic segment 5 lesion which correlates with FDG avid focus seen in PET/CT, concerning for metastasis. There is a new lytic lesion in the T7 vertebral body which correlates with FDG avid focus seen in PET/CT, also concerning for metastasis.  10/21/24 Patient presents to discuss the role of radiation therapy in his care. Denies pain. Generally doing well.   11/19/24 Completed planned RT to Spine T7 total dose 1800 cGy in 1 fraction.   11/21/24 MRI RT Foot IMPRESSION: Findings concerning for an evolving stress fracture within the second proximal phalanx  12/31/24 Patient presents today for follow up. Denies pain. Overall doing well.

## 2025-01-07 NOTE — REASON FOR VISIT
[Follow-Up Visit] : a follow-up visit for [Onychomycosis] : onychomycosis [FreeTextEntry2] : ingrown nails and nail fungus

## 2025-01-07 NOTE — PHYSICAL EXAM
[General Appearance - Alert] : alert [General Appearance - In No Acute Distress] : in no acute distress [No Joint Swelling] : no joint swelling [] : normal strength/tone [Normal Foot/Ankle] : Both lower extremities were exposed and visualized. Standing exam demonstrates normal foot posture and alignment. Hindfoot exam shows no hindfoot valgus or varus [FreeTextEntry1] : Patient with tinea pedis onychomycosis and moderate ingrown toenails. [Sensation] : the sensory exam was normal to light touch and pinprick [No Focal Deficits] : no focal deficits [Deep Tendon Reflexes (DTR)] : deep tendon reflexes were 2+ and symmetric [Motor Exam] : the motor exam was normal [Oriented To Time, Place, And Person] : oriented to person, place, and time [Impaired Insight] : insight and judgment were intact [Affect] : the affect was normal

## 2025-01-07 NOTE — ASSESSMENT
[FreeTextEntry1] : Patient has ingrown nails we discussed surgical excision patient would like to take a more conservative course of care curettage corners slant back performed no anesthesia necessary packed with cotton recommend twice daily soaks.  Patient also with thick yellow fungal toenails both big toes discussed oral medication patient would rather take topical course of care.  We also discussed class IV laser patient also has a history of tinea pedis recommend shoe gear changes and hygiene I recommend twice daily powder.  Follow-up 10 weeks

## 2025-01-07 NOTE — REVIEW OF SYSTEMS
[Negative] : Heme/Lymph [de-identified] : Patient with ingrown toenails and nail fungus.  Patient also has some tinea pedis bilateral.

## 2025-01-29 NOTE — ASSESSMENT
[FreeTextEntry1] : Patient has melanoma on right chest resected 11/2020. A positive right axillary node also found. On CT scan 12/2020 there are 2 subcentimeter nodules that are reported to be new since 9/2019. Patient has returned from [facility] for further evaluation and treatment. This is considered Stage 3b. PET and CT are reviewed today. The CT shows nodules, and patient underwent biopsy with Dr Woodard.   Nivolumab  C1 3/30/2021. C26 02/28/2023 - last treatment.  Danny (Signatera) performed on 3/1/2022: Signatera Positive had converted to negative on 7/19/2022.  Then it turned positive again August 29, 2024 ctDNA = 10.11  Patient with h/o melanoma just in lungs. He was on treatment for 2 years. Had imaging done at [facility]. I had it loaded. Will ask radiology to review the PET - there is a liver and spine T7 lesion. Had rise of Signatera.  IPI 3 + NIVO 1 C1D1 10/23 C2 11/13 C3 12/4 C4 12/26 C1 maintenance Nivolumab 1/23/25  He is s/p 1 cycle of Nivolumab 480 mg/dose on 1/23/25  He is s/p mechanical fall a few weeks and was seen by urgent care. Patient recalled the incident and did NOT his head. He landed on his right lower back/buttock area + right side of his body. He was able to get up and perform his routine/instrumental ADLs afterwards. Three days later patient was having severe pain in the right rib and went to urgent care. XRAYs were negative. He was given pain medication/muscle relaxers. Pain has improved.  PET/CT scheduled for 2/3/25  We re-reviewed his CT on 1/6/25  f/u q cycle.  Dr. Ayala agrees with above plan.

## 2025-01-29 NOTE — HISTORY OF PRESENT ILLNESS
[de-identified] : Mr. Robles is a 56 year old gentlemen with no significant past medical history presenting to the office for an initial consultation of melanoma.  Patient has been in the [facility] for 6 years. 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 Lived in most of [place] for over 20 years.  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 Has been an athlete for many decades. He is vegan. He swims a up to a couple miles daily.  He is open to complementary medicine, and meditation.   Patient noted development of a solitary hyperpigmented plaque over the right side of chest, which was gradually increasing in diameter with a slight change in color. Patient has history of significant sun exposure and was not fond of using sun protection. Punch Biopsy on 3/2019. Excisional biopsy taken of lesion 9/13/2019. Wide local excision 11/13/2020: Pathology: Consistent with melanoma. Axilla lymph node: 1/1 consistent with melanoma. Thickness, ulceration status and IHC are not available at this time.  CT chest 9/2019: No evidence of metastatic disease 12/23/2020 CT head, chest and abdomen performed in the [facility]: CT head negative for metastatic disease; CT chest demonstrates an increase in the size of the previously seen 8x5 mm nodule in the upper outer right anterior chest which currently measures 14x10 mm.; CT abdomen no evidence of metastatic disease. CT chest - RLL 6 mm and LLL 3 mm (compared with CT 9/2019).   2/19/21: I discussed PET scan and CT with radiologist. Lung lesions too small to confirm melanoma by CT biopsy. Thoracic surgeon would need to be consulted. I called Dr. Woodard, and he believes that the lesion in the right lung is accessible surgically. Also he thinks that there is a possibility of infection as opposed to melanoma. Patient is aware of this and is making an appointment. Patient slides obtained, and confirmed Stage 3b. We discussed that if the lungs are negative for melanoma, then would recommend 1 year adjuvant Opdivo as adjuvant.  3/12/2021 Patient here to review pathology, labs, films. He feels well and is able to play tennis  3/30/2021: 1) metastatic melanoma: Patient is here for C1 NIvolumab.He is feeling well physically and psychologically and ready to start treatment.  We re-reviewed logistics, mechanism of action and most common irAEs from Nivolumab. CT chest 3/30/2021: In comparison with 12/9/2021, status post interval right lower lobe wedge resection. 6 mm right lower lobe nodule (series 2 image 96) is enlarged; previously 2 mm. The remaining 8 mm and smaller lung nodules annotated on series 2 are stable. Difficult to delineate asymmetric soft tissue density lateral to the right pectoral muscle (series 2 image 38) is not significantly changed in size but contains a new small focus of hypodensity (series 601B image 74), possibly postprocedural seroma. 2) COVID-19 infection: Patient was tested positive for COVID-19 on 3/16.  He is asymptomatic and denies fever, chills, change in taste/smell, cough, SOB , diarrhea or fatigue. 3) Vaccine: Rah and Rah vaccine was given on 3/25/2021.   4/14/2021: 1) Metastatic melanoma: Patient is here for toxicity evaluation s/p one cycle of Nivolumab on 3/30/2021.  Patient did well and reports no significant irAEs.  Denies fever, chills, fatigue, arthralgia, myalgia, cough, rash, diarrhea, cough or chest pain. 2) Facial burning: Patient underwent Blue light procedure on 4/13/2021 with Dermatology.  He noticed "burning" on his face a couple of hour after his procedure which is causing some irritation.  He is scheduled to be evaluated by dermatology today.  5/11/2021: Metastatic melanoma: Patient is here for toxicity evaluation s/p two cycle of Nivolumab. Patient did well and reports no significant irAEs.  Denies fever, chills, fatigue, arthralgia, myalgia, cough, rash, diarrhea, cough or chest pain. COVID side effects have improved and completely resolved. No irAEs Has started swimming again [employer] after C#4  5/25/2021: 1) melanoma: Patient is here for C3 Nivolumab.  He is doing well and reports no significant irAEs.   Denies fever, chills, fatigue, arthralgia, myalgia, cough, rash, diarrhea, cough or chest pain. Patient admits to grade one fatigue which he is able to push through.  No restrictions with his ADLs.  He is able to swim. We reviewed labs, no acute abnormality noted.  WIll recheck TSH levels today.  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 patient had covid and is feeling better but not at baseline - he is able to swim long distances but notes he gets fatigued at times when walks distances Due for CT scan next week.  9/14/2021: 1) Melanoma: Patient is here for C7 Nivolumab. Patient is doing well on treatment and voices no new complaints. Denies any significant irAEs. Denies fever, chills,  cough, SOB, arthralgia, myalgia, diarrhea. 2) checkpoint inhibitor induced dermatitis, : Has improved. Currently not using any ointments or cream.  Patient has been moisturizer. 3) Hypothyroid: Patient was evaluated by Endocrine, Dr. Martell who recommended Levothyroxine 125mcg Her thyroid functions are improving  4) Social: Patient is currently living at apartment close to [facility] house. He is currently not working.  11/9/2021: 1) Melanoma: Patient is here for C9 Nivolumab. Patient is doing well on treatment and voices no new complaints. Denies any significant irAEs today.  Denies fever, chills, cough, SOB, arthralgia, myalgia, diarrhea. 2) checkpoint inhibitor induced dermatitis: Has improved on phototherapy & topical steroids not currently using any ointments or cream.  Patient has been moisturizer. 3) Hypothyroid: Patient was evaluated by Endocrine, Dr. Martell who recommended Levothyroxine 137 mcg Her thyroid functions are improving. 4) Right upper quadrant pain: Patient admits to pain in the right lateral abdominal area x few months. Will have radiology review imaging from 10/2021. Appears to be musculoskeletal in nature.   1) Melanoma:  C12 Nivolumab. He is tolerating treatment well and reports no significant irAEs. Labs reviewed with patient on 1/4/2022, no acute abnormality. CT chest, abdomen/pelvis ordered. He has no restrictions with his ADLs. He is now ice skating. Diet and weight remain stable. He is being followed by Dr. 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  Currently on Levothyroxine 137 mcg.  3/1/2022 1)Melanoma C13 Nivolumab today. He is tolerating treatment extremely well and reports no significant irAEs. Patient underwent CT chest, abdomen/pelvis on 2/15/2022 which demonstrated the previously noted right lower lobe nodule no longer visualized. Otherwise, stable exam. He was evaluated by endocrine for hypothyroid, patient is now on Levothyroxine 150 mcg daily. He has no restrictions with his ADLs. Patient admits to grade one fatigue but is able to push through . He is able to swim ~ 2 miles/day.  Continues UV q 2 weeks, and will discuss stopping it. Notes dry mouth and taste.  3/29/2022: C14 Nivolumab today. He is tolerating treatment relatively well and reports no significant irAEs. He has no restrictions with his ADLs. Labs reviewed and LFT's are trending upwards. Grade one fatigue but is able to push through. He is currently on Levothyroxine 150 mcg daily and doing well. He is adjusting to the dose and will not start Cytomel at this time. Due to dry mouth Sjogren syndrome labs were ordered however not performed last visit, and will do so this time. He does note improved taste after a course of clotrimazole.  4/26/2022: C15 Nivolumab today. Labs reviewed, no acute abnormality. Thyroid functions remain WNL. Sjogren labs performed due to dry mouth, labs WNL. Tolerating treatment extremely well and reports on significant irAEs. Danny (Signatera) performed on 3/1/2022: Signatera Positive ; MTM/mL: 0.65  5/24/22 Patient will have C16 nivolumab. Repeat ct DNA next cycle. If positive, get PET. If negative, consider a few more treatments and stop or complete 2 year course. Thyroid issue seems to be addressed. Phototherapy is now once a month. Had stopped and then restarted due to increase in dermatitis. Mental state is positive. Remains underactive in terms of exercise. Has been supporting his father and family with medical issues.  7/19/2022 C18 Nivolumab today. Patient is tolerating treatment well and reports no significant irAEs. Patient is no longer receiving light therapy from dermatology. Patient is currently on Levothyroxine from 150 mcg to 137 mcg. Admits to grade one fatigue intermittently but has improved. He is now back to baseline. He continues to swim and has no restrictions with his ADLs.  8/16/22 C19 Nivolumab Doing well overall. Lisa has converted to negative on 7/19/22 Will continue CT scan q 4 months. Needs full TFTs today. Able to swim for long periods of time. Stopped phototherapy for  checkpoint inhibitor induced dermatitis. His only complaint is ear scaling.  9/15/2022: C20 Nivolumab on 9/13/2022 He is doing well on treatment and voices no major irAEs. Grade one fatigue but is able to push through. We reviewed his CT chest, abdomen/pelvis on 8/2022. Next CT 12/2022. Lisa on draw#2 negative on 7/29/2022 Next draw 10/2022.  10/07/2022 S/P C20 Nivolumab on 09/13/2022, and next cycle will be on 10/11/2022 He c/o pain to left side lower back. Also, he has some fatigue. His ear infection has cleared with use of medicated drops. CT-Scan of chest of 8/25/2022 showed slight increase of right chest wall nodule. Will get US and possible biopsy of nodule to r/o abnormal pathology.  11/08/2022: C22 Nivolumab today. US biopsy of chest wall negative on 10/2022. Patient is feeling well today and voices no major irAEs. Admits to grade one fatigue intermittently however is able to push through. Danny franklin D#3 today. Intermittent rash on his lower back and forehead.  He is using Triamcinolone on his lower back and Metronidazole on his forehead.  12/6/22 Patient here for C23 Will complete one more dose and then start surveillance with scans and ct-DNA q 3 months. Will obtain lab monitoring at home. He dose still have fatigue and thinks that it is related to low testosterone. Has not been sexually active.  01/03/2023 C24 Nivolumab today. Lisa Delgado D#3 on 11/2022 negative. D#4 02/2023 CT chest, abdomen/pelvis on 12/15/2022 which revealed no evidence of recurrent and/or metastatic disease. He is tolerating treatment relatively well and reports no major irAEs. Labs reviewed. Hormones are WNL.  01/31/2023: C25 Nivolumab today. Will treat for 26 cycles and stop. No major irAEs. D#4 Signatera on 01/03/2023 negative. He is doing well and voices no major complaints. Patient has gained weight while on treatment. Has not been exercising as often.  02/28/2023 C26 Nivolumab today (last treatment) No major irAEs. CT chest, abdomen/pelvis on 03/02/2023. He was seen by cardiology (Dr. Bingham) due to CORLEY. He underwent ECG and TTE which revealed no acute abnormality. His EF is 59%, normal LV systolic function. His exercise induced stress test did not reveal any acute abnormality. He was advised to undergo CT chest (calcium score) and will schedule.  07/07/2023 He is here for his follow up for metastatic melanoma. He was seen by Dr. Zaragoza (Ortho) for bilateral foot pain, R>L and swelling x 3 weeks. Patient has been doing PT and home stretch exercises which has been helping. His last signatera on 04/2023 was negative. Will repeat today. Last CT on 03/2023 stable. Next CT on 09/2023. Patient continues to have issues with his energy level. He is resting more and not as active however has no restrictions with his routine/instrumental ADLs. Patient has gained weight.  3/7/2024 Signatera Danny 2/19/2024 = 0.00 CT 2/21/2024 LUNGS AND LARGE AIRWAYS: Patent central airways. Stable submillimeter right upper lobe pulmonary nodule on image 27 of series 303. Left lower lobe tiny nodules bilaterally seen on the current exam. Patient is again noted to be post right lower lobe wedge resection PLEURA: No pleural effusion. VESSELS: Within normal limits. HEART: Heart size is normal. No pericardial effusion. MEDIASTINUM AND BRIDGER: No lymphadenopathy. CHEST WALL AND LOWER NECK: Scattered subcutaneous nodules similar in appearance to prior study. Representative nodule on image 29 of series 303 measures 7 mm and is unchanged. Right axillary scarring is also unchanged. Right sided mild gynecomastia is stable as well. LIVER: Within normal limits. BILE DUCTS: Normal caliber. GALLBLADDER: Within normal limits. SPLEEN: Unremarkable PANCREAS: Within normal limits. ADRENALS: Within normal limits. KIDNEYS/URETERS: Within normal limits. BLADDER: Minimally distended. REPRODUCTIVE ORGANS: Prostate within normal limits. BOWEL: No bowel obstruction. Appendix is normal. A small hiatal hernia is seen with mild colonic diverticulosis without diverticulitis PERITONEUM: No ascites VESSELS: Within normal limits. RETROPERITONEUM/LYMPH NODES: No lymphadenopathy. Prominent bilateral inguinal lymph nodes are unchanged. ABDOMINAL WALL: 2 small fat-containing inguinal hernias. Small midline fat-containing ventral hernia. BONES: Degenerative changes. IMPRESSION: Stable examination.  Feeling well and voices no new complaints. Addressed all questions. Looking to lose some weight. Plans to go to the [facility]. He is looking to take care of his parents more here, and work with [facility] remotely.  7/1/24 Last Signatera Danny on 6/12 is not detected. Will repeat in October 2024. Feeling well and voices no major complaints. 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  10/3/2024 Patient with h/o melanoma just in lungs. He was on treatment for 2 years. Had imaging done at [facility]. I had it loaded. Will ask radiology to review the PET - there is a liver and spine T7 lesion. Had rise of Signatera.  10/23/24 He is for C1 Ipilimumab (3) + Nivolumab (1) Was evaluated by Dr. Jeff Briscoe (Urology) for sperm banking. Patient wanted to discuss his labs prior to receiving therapy. He was educated no contraindications to receive therapy with positive Hb AB. He was positive in the past (2/2021) and had negative antigens. He was told his syphillis titers were positive. Unable to located in his chart. Requested labs be sent over to our team. He will need to be seen one week prior to C2 for toxicity evaluation and labs.  11/6/24 Patient is here for toxicity evaluation s/p 1 cycle of Ipilimumab (3) + Nivolumab (1). Did well and voices no major irAEs. Mild headache one day which has subsided Labs today. 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 Mild fatigue but is able to push through.  11/25/24 S/P 2 cycles of Ipi 3 + Nivo 1 on 11/13. C3 is scheduled for 12/4. Patient completed SRS to T spine (T7) in 1 fraction on 11/19/24. Labs today. Doing well, no major irAEs. Admits to soreness in the T7 region after radiation. No major pain. Admits to dry mouth and was given Clotrimazole with relief.  12/19/24 Patient is here s/p 3 cycles of Ipi 3 and Nivo 1. C4 is scheduled for 12/26/24. Patient endorses headache in the back of his head and frontal lobe. It has been more consistent over the last 5-7 days. No triggering factors a/w pain. No trauma to area. Pain is very mild and abates without acute intervention. Does not wake him up in the middle of the night. No change in mental status. No history of migraines in the past.  1/29/25 He is s/p 1 cycle of Nivolumab 480 mg/dose on 1/23/25 He is s/p mechanical fall a few weeks and was seen by urgent care. Patient recalled the incident and did NOT his head. He landed on his right lower back/buttock area + right side of his body. He was able to get up and perform his routine/instrumental ADLs afterwards. Three days later patient was having severe pain in the right rib and went to urgent care. XRAYs were negative. He was given pain medication/muscle relaxers. PET/CT scheduled for 2/3/25     [de-identified] : Surgical Oncology: Santos Seals\par  Thoracic Surgeon: Gatito Woodard \par  \par  Pt's sister Yohana Baca is best contact:  [phone]\par  \par  Patient sister home: [phone]\par  Patient new cell: [phone]\par  Back up number - [phone]

## 2025-02-20 NOTE — REVIEW OF SYSTEMS
[Diarrhea: Grade 0] : Diarrhea: Grade 0 [Fever] : no fever [Chills] : no chills [Night Sweats] : no night sweats [Fatigue] : no fatigue [Dysphagia] : no dysphagia [Odynophagia] : no odynophagia [Chest Pain] : no chest pain [Palpitations] : no palpitations [Shortness Of Breath] : no shortness of breath [Wheezing] : no wheezing [Cough] : no cough [Abdominal Pain] : no abdominal pain [Vomiting] : no vomiting [Constipation] : no constipation [Dysuria] : no dysuria [Skin Rash] : no skin rash

## 2025-02-20 NOTE — PHYSICAL EXAM
[Normal] : affect appropriate [de-identified] : dry MM [de-identified] : breathing comfortably on room air

## 2025-02-20 NOTE — HISTORY OF PRESENT ILLNESS
[de-identified] : Mr. Robles is a 56 year old gentlemen with no significant past medical history presenting to the office for an initial consultation of melanoma.  Patient has been in the [facility] for 6 years. 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 Lived in most of [place] for over 20 years.  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 Has been an athlete for many decades. He is vegan. He swims a up to a couple miles daily.  He is open to complementary medicine, and meditation.   Patient noted development of a solitary hyperpigmented plaque over the right side of chest, which was gradually increasing in diameter with a slight change in color. Patient has history of significant sun exposure and was not fond of using sun protection. Punch Biopsy on 3/2019. Excisional biopsy taken of lesion 9/13/2019. Wide local excision 11/13/2020: Pathology: Consistent with melanoma. Axilla lymph node: 1/1 consistent with melanoma. Thickness, ulceration status and IHC are not available at this time.  CT chest 9/2019: No evidence of metastatic disease 12/23/2020 CT head, chest and abdomen performed in the [facility]: CT head negative for metastatic disease; CT chest demonstrates an increase in the size of the previously seen 8x5 mm nodule in the upper outer right anterior chest which currently measures 14x10 mm.; CT abdomen no evidence of metastatic disease. CT chest - RLL 6 mm and LLL 3 mm (compared with CT 9/2019).   2/19/21: I discussed PET scan and CT with radiologist. Lung lesions too small to confirm melanoma by CT biopsy. Thoracic surgeon would need to be consulted. I called Dr. Woodard, and he believes that the lesion in the right lung is accessible surgically. Also he thinks that there is a possibility of infection as opposed to melanoma. Patient is aware of this and is making an appointment. Patient slides obtained, and confirmed Stage 3b. We discussed that if the lungs are negative for melanoma, then would recommend 1 year adjuvant Opdivo as adjuvant.  3/12/2021 Patient here to review pathology, labs, films. He feels well and is able to play tennis  3/30/2021: 1) metastatic melanoma: Patient is here for C1 NIvolumab.He is feeling well physically and psychologically and ready to start treatment.  We re-reviewed logistics, mechanism of action and most common irAEs from Nivolumab. CT chest 3/30/2021: In comparison with 12/9/2021, status post interval right lower lobe wedge resection. 6 mm right lower lobe nodule (series 2 image 96) is enlarged; previously 2 mm. The remaining 8 mm and smaller lung nodules annotated on series 2 are stable. Difficult to delineate asymmetric soft tissue density lateral to the right pectoral muscle (series 2 image 38) is not significantly changed in size but contains a new small focus of hypodensity (series 601B image 74), possibly postprocedural seroma. 2) COVID-19 infection: Patient was tested positive for COVID-19 on 3/16.  He is asymptomatic and denies fever, chills, change in taste/smell, cough, SOB , diarrhea or fatigue. 3) Vaccine: Rah and Rah vaccine was given on 3/25/2021.   4/14/2021: 1) Metastatic melanoma: Patient is here for toxicity evaluation s/p one cycle of Nivolumab on 3/30/2021.  Patient did well and reports no significant irAEs.  Denies fever, chills, fatigue, arthralgia, myalgia, cough, rash, diarrhea, cough or chest pain. 2) Facial burning: Patient underwent Blue light procedure on 4/13/2021 with Dermatology.  He noticed "burning" on his face a couple of hour after his procedure which is causing some irritation.  He is scheduled to be evaluated by dermatology today.  5/11/2021: Metastatic melanoma: Patient is here for toxicity evaluation s/p two cycle of Nivolumab. Patient did well and reports no significant irAEs.  Denies fever, chills, fatigue, arthralgia, myalgia, cough, rash, diarrhea, cough or chest pain. COVID side effects have improved and completely resolved. No irAEs Has started swimming again [employer]image after C#4  5/25/2021: 1) melanoma: Patient is here for C3 Nivolumab.  He is doing well and reports no significant irAEs.   Denies fever, chills, fatigue, arthralgia, myalgia, cough, rash, diarrhea, cough or chest pain. Patient admits to grade one fatigue which he is able to push through.  No restrictions with his ADLs.  He is able to swim. We reviewed labs, no acute abnormality noted.  WIll recheck TSH levels today.  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 patient had covid and is feeling better but not at baseline - he is able to swim long distances but notes he gets fatigued at times when walks distances Due for CT scan next week.  9/14/2021: 1) Melanoma: Patient is here for C7 Nivolumab. Patient is doing well on treatment and voices no new complaints. Denies any significant irAEs. Denies fever, chills,  cough, SOB, arthralgia, myalgia, diarrhea. 2) checkpoint inhibitor induced dermatitis, : Has improved. Currently not using any ointments or cream.  Patient has been moisturizer. 3) Hypothyroid: Patient was evaluated by Endocrine, Dr. Martell who recommended Levothyroxine 125mcg Her thyroid functions are improving  4) Social: Patient is currently living at apartment close to [facility] house. He is currently not working.  11/9/2021: 1) Melanoma: Patient is here for C9 Nivolumab. Patient is doing well on treatment and voices no new complaints. Denies any significant irAEs today.  Denies fever, chills, cough, SOB, arthralgia, myalgia, diarrhea. 2) checkpoint inhibitor induced dermatitis: Has improved on phototherapy & topical steroids not currently using any ointments or cream.  Patient has been moisturizer. 3) Hypothyroid: Patient was evaluated by Endocrine, Dr. Martell who recommended Levothyroxine 137 mcg Her thyroid functions are improving. 4) Right upper quadrant pain: Patient admits to pain in the right lateral abdominal area x few months. Will have radiology review imaging from 10/2021. Appears to be musculoskeletal in nature.   1) Melanoma:  C12 Nivolumab. He is tolerating treatment well and reports no significant irAEs. Labs reviewed with patient on 1/4/2022, no acute abnormality. CT chest, abdomen/pelvis ordered. He has no restrictions with his ADLs. He is now ice skating. Diet and weight remain stable. He is being followed by Dr. 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  Currently on Levothyroxine 137 mcg.  3/1/2022 1)Melanoma C13 Nivolumab today. He is tolerating treatment extremely well and reports no significant irAEs. Patient underwent CT chest, abdomen/pelvis on 2/15/2022 which demonstrated the previously noted right lower lobe nodule no longer visualized. Otherwise, stable exam. He was evaluated by endocrine for hypothyroid, patient is now on Levothyroxine 150 mcg daily. He has no restrictions with his ADLs. Patient admits to grade one fatigue but is able to push through . He is able to swim ~ 2 miles/day.  Continues UV q 2 weeks, and will discuss stopping it. Notes dry mouth and taste.  3/29/2022: C14 Nivolumab today. He is tolerating treatment relatively well and reports no significant irAEs. He has no restrictions with his ADLs. Labs reviewed and LFT's are trending upwards. Grade one fatigue but is able to push through. He is currently on Levothyroxine 150 mcg daily and doing well. He is adjusting to the dose and will not start Cytomel at this time. Due to dry mouth Sjogren syndrome labs were ordered however not performed last visit, and will do so this time. He does note improved taste after a course of clotrimazole.  4/26/2022: C15 Nivolumab today. Labs reviewed, no acute abnormality. Thyroid functions remain WNL. Sjogren labs performed due to dry mouth, labs WNL. Tolerating treatment extremely well and reports on significant irAEs. Danny (Signatera) performed on 3/1/2022: Signatera Positive ; MTM/mL: 0.65  5/24/22 Patient will have C16 nivolumab. Repeat ct DNA next cycle. If positive, get PET. If negative, consider a few more treatments and stop or complete 2 year course. Thyroid issue seems to be addressed. Phototherapy is now once a month. Had stopped and then restarted due to increase in dermatitis. Mental state is positive. Remains underactive in terms of exercise. Has been supporting his father and family with medical issues.  7/19/2022 C18 Nivolumab today. Patient is tolerating treatment well and reports no significant irAEs. Patient is no longer receiving light therapy from dermatology. Patient is currently on Levothyroxine from 150 mcg to 137 mcg. Admits to grade one fatigue intermittently but has improved. He is now back to baseline. He continues to swim and has no restrictions with his ADLs.  8/16/22 C19 Nivolumab Doing well overall. Lisa has converted to negative on 7/19/22 Will continue CT scan q 4 months. Needs full TFTs today. Able to swim for long periods of time. Stopped phototherapy for  checkpoint inhibitor induced dermatitis. His only complaint is ear scaling.  9/15/2022: C20 Nivolumab on 9/13/2022 He is doing well on treatment and voices no major irAEs. Grade one fatigue but is able to push through. We reviewed his CT chest, abdomen/pelvis on 8/2022. Next CT 12/2022. Lisa on draw#2 negative on 7/29/2022 Next draw 10/2022.  10/07/2022 S/P C20 Nivolumab on 09/13/2022, and next cycle will be on 10/11/2022 He c/o pain to left side lower back. Also, he has some fatigue. His ear infection has cleared with use of medicated drops. CT-Scan of chest of 8/25/2022 showed slight increase of right chest wall nodule. Will get US and possible biopsy of nodule to r/o abnormal pathology.  11/08/2022: C22 Nivolumab today. US biopsy of chest wall negative on 10/2022. Patient is feeling well today and voices no major irAEs. Admits to grade one fatigue intermittently however is able to push through. Danny franklin D#3 today. Intermittent rash on his lower back and forehead.  He is using Triamcinolone on his lower back and Metronidazole on his forehead.  12/6/22 Patient here for C23 Will complete one more dose and then start surveillance with scans and ct-DNA q 3 months. Will obtain lab monitoring at home. He dose still have fatigue and thinks that it is related to low testosterone. Has not been sexually active.  01/03/2023 C24 Nivolumab today. Lisa Delgado D#3 on 11/2022 negative. D#4 02/2023 CT chest, abdomen/pelvis on 12/15/2022 which revealed no evidence of recurrent and/or metastatic disease. He is tolerating treatment relatively well and reports no major irAEs. Labs reviewed. Hormones are WNL.  01/31/2023: C25 Nivolumab today. Will treat for 26 cycles and stop. No major irAEs. D#4 Signatera on 01/03/2023 negative. He is doing well and voices no major complaints. Patient has gained weight while on treatment. Has not been exercising as often.  02/28/2023 C26 Nivolumab today (last treatment) No major irAEs. CT chest, abdomen/pelvis on 03/02/2023. He was seen by cardiology (Dr. Bingham) due to CORLEY. He underwent ECG and TTE which revealed no acute abnormality. His EF is 59%, normal LV systolic function. His exercise induced stress test did not reveal any acute abnormality. He was advised to undergo CT chest (calcium score) and will schedule.  07/07/2023 He is here for his follow up for metastatic melanoma. He was seen by Dr. Zaragoza (Ortho) for bilateral foot pain, R>L and swelling x 3 weeks. Patient has been doing PT and home stretch exercises which has been helping. His last signatera on 04/2023 was negative. Will repeat today. Last CT on 03/2023 stable. Next CT on 09/2023. Patient continues to have issues with his energy level. He is resting more and not as active however has no restrictions with his routine/instrumental ADLs. Patient has gained weight.  3/7/2024 Signatera Danny 2/19/2024 = 0.00 CT 2/21/2024 LUNGS AND LARGE AIRWAYS: Patent central airways. Stable submillimeter right upper lobe pulmonary nodule on image 27 of series 303. Left lower lobe tiny nodules bilaterally seen on the current exam. Patient is again noted to be post right lower lobe wedge resection PLEURA: No pleural effusion. VESSELS: Within normal limits. HEART: Heart size is normal. No pericardial effusion. MEDIASTINUM AND BRIDGER: No lymphadenopathy. CHEST WALL AND LOWER NECK: Scattered subcutaneous nodules similar in appearance to prior study. Representative nodule on image 29 of series 303 measures 7 mm and is unchanged. Right axillary scarring is also unchanged. Right sided mild gynecomastia is stable as well. LIVER: Within normal limits. BILE DUCTS: Normal caliber. GALLBLADDER: Within normal limits. SPLEEN: Unremarkable PANCREAS: Within normal limits. ADRENALS: Within normal limits. KIDNEYS/URETERS: Within normal limits. BLADDER: Minimally distended. REPRODUCTIVE ORGANS: Prostate within normal limits. BOWEL: No bowel obstruction. Appendix is normal. A small hiatal hernia is seen with mild colonic diverticulosis without diverticulitis PERITONEUM: No ascites VESSELS: Within normal limits. RETROPERITONEUM/LYMPH NODES: No lymphadenopathy. Prominent bilateral inguinal lymph nodes are unchanged. ABDOMINAL WALL: 2 small fat-containing inguinal hernias. Small midline fat-containing ventral hernia. BONES: Degenerative changes. IMPRESSION: Stable examination.  Feeling well and voices no new complaints. Addressed all questions. Looking to lose some weight. Plans to go to the [facility]. He is looking to take care of his parents more here, and work with [facility] remotely.  7/1/24 Last Signatera Danny on 6/12 is not detected. Will repeat in October 2024. Feeling well and voices no major complaints. 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  10/3/2024 Patient with h/o melanoma just in lungs. He was on treatment for 2 years. Had imaging done at [facility]. I had it loaded. Will ask radiology to review the PET - there is a liver and spine T7 lesion. Had rise of Signatera.  10/23/24 He is for C1 Ipilimumab (3) + Nivolumab (1) Was evaluated by Dr. Jeff Briscoe (Urology) for sperm banking. Patient wanted to discuss his labs prior to receiving therapy. He was educated no contraindications to receive therapy with positive Hb AB. He was positive in the past (2/2021) and had negative antigens. He was told his syphillis titers were positive. Unable to located in his chart. Requested labs be sent over to our team. He will need to be seen one week prior to C2 for toxicity evaluation and labs.  11/6/24 Patient is here for toxicity evaluation s/p 1 cycle of Ipilimumab (3) + Nivolumab (1). Did well and voices no major irAEs. Mild headache one day which has subsided Labs today. 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 Mild fatigue but is able to push through.  11/25/24 S/P 2 cycles of Ipi 3 + Nivo 1 on 11/13. C3 is scheduled for 12/4. Patient completed SRS to T spine (T7) in 1 fraction on 11/19/24. Labs today. Doing well, no major irAEs. Admits to soreness in the T7 region after radiation. No major pain. Admits to dry mouth and was given Clotrimazole with relief.  12/19/24 Patient is here s/p 3 cycles of Ipi 3 and Nivo 1. C4 is scheduled for 12/26/24. Patient endorses headache in the back of his head and frontal lobe. It has been more consistent over the last 5-7 days. No triggering factors a/w pain. No trauma to area. Pain is very mild and abates without acute intervention. Does not wake him up in the middle of the night. No change in mental status. No history of migraines in the past.  1/29/25 He is s/p 1 cycle of Nivolumab 480 mg/dose on 1/23/25 He is s/p mechanical fall a few weeks and was seen by urgent care. Patient recalled the incident and did NOT his head. He landed on his right lower back/buttock area + right side of his body. He was able to get up and perform his routine/instrumental ADLs afterwards. Three days later patient was having severe pain in the right rib and went to urgent care. XRAYs were negative. He was given pain medication/muscle relaxers. PET/CT scheduled for 2/3/25  02/20/25 Reports occasional back soreness which is improving Has MRI cervical spine in a few days  Had thrush but thinks it has improved; it numbed his mouth so he stopped taking; currently has dry lips/mouth, inside of mouth feels burned like after eating something hot, taste feels different Skin feels very good, no rashes During last treatment had some prickling/small red dots in chest but resolved No headaches for over a month Occasional fatigue C2 nivolumab today Losing some weight intentionally from being busier; eating vegan and less processed foods, drinking more infused water  [de-identified] : Surgical Oncology: Santos Seals\par  Thoracic Surgeon: Gatito Woodard \par  \par  Pt's sister Yohana Baca is best contact:  [phone]\par  \par  Patient sister home: [phone]\par  Patient new cell: [phone]\par  Back up number - [phone]

## 2025-02-20 NOTE — ASSESSMENT
[FreeTextEntry1] : Patient has melanoma on right chest resected 11/2020. A positive right axillary node also found. On CT scan 12/2020 there are 2 subcentimeter nodules that are reported to be new since 9/2019. Patient has returned from [facility] for further evaluation and treatment. This is considered Stage 3b. PET and CT are reviewed today. The CT shows nodules, and patient underwent biopsy with Dr Woodard.   Nivolumab  C1 3/30/2021. C26 02/28/2023 - last treatment.  Danny (Signatera) performed on 3/1/2022: Signatera Positive had converted to negative on 7/19/2022.  Then it turned positive again August 29, 2024 ctDNA = 10.11  Patient with h/o melanoma just in lungs. He was on treatment for 2 years. Had imaging done at [facility]. I had it loaded. Will ask radiology to review the PET - there is a liver and spine T7 lesion. Had rise of Signatera.  IPI 3 + NIVO 1 C1D1 10/23 C2 11/13 C3 12/4 C4 12/26 C1 maintenance Nivolumab 1/23/25 He is s/p 1 cycle of Nivolumab 480 mg/dose on 1/23/25 C2 maintenance nivolumab 2/20/25  He is s/p mechanical fall a few weeks and was seen by urgent care. Patient recalled the incident and did NOT his head. He landed on his right lower back/buttock area + right side of his body. He was able to get up and perform his routine/instrumental ADLs afterwards. Three days later patient was having severe pain in the right rib and went to urgent care. XRAYs were negative. He was given pain medication/muscle relaxers. Pain has improved.  2/2025 PET/CT showed very good response.  Siganatera  Feb 12, 2025 0.00 Dec 26, 2024 45.47 Nov 25, 2024 43.55 Aug 29, 2024 10.11 Jun 12, 2024 0.00 Feb 19, 2024 0.00 Nov 28, 2023 0.00 Jul 07, 2023 0.00 Apr 07, 2023 0.00 Feb 28, 2023 0.00 Jan 03, 2023 0.00 Nov 08, 2022 0.00 Jul 19, 2022 0.00 Mar 01, 2022 0.65  Reviewed recent PET/CT scan with Mr. Kullman, showing there is response, and therefore we will continue maintenance nivolumab.   Will check testicular ultrasound given hypermetabolism noted on PET. Will add on testosterone level today.   Will refer to ENT for dry mouth/secretions and dental. Contact VANESSA Castro to assist.  f/u q cycle.  Pt seen and d/w Dr. Ayala . Dion Calderón MD Hematology/Oncology Fellow, PGY-5

## 2025-02-26 NOTE — ASSESSMENT
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 Notes that he will need immunotherapy for 2 years total as per his oncology team. Pt has never previously been on thyroid medications.   1. Check Point Inhibitor induced Hypothyroidism.  TSH 3.67 -Continue Levothyroxine 137mcg QD  -Check point inhibitors are known to cause endocrinopathies including adrenal insufficiency, diabetes, hypophysitis/hypopituitarism. Repeat AM cortisol.  -Repeat TFTs in 2 months with Dr. Ayala -Pt requesting gonadal workup as he may be TTC in the near future.   Patient verbalized understanding of the above. All questions were answered to patient's satisfaction. Dispo: Patient will follow up in 6 months

## 2025-02-26 NOTE — HISTORY OF PRESENT ILLNESS
[FreeTextEntry1] : Mr. Robles is presenting for follow up of check point Inhibitor induced Hypothyroidism.   Oncologist: Dr. Ayala  Dermatologist: Dr. Carrillo, Cynthia   60 year old male with metastatic melanoma (diagnosed feb 2021) s/p RLL wedge resection x 2 (March 2, 2021), currently undergoing treatment with check point inhibitor (nivolumab) started March 30th, 2021 and completed after 26 months on Feb 2025, nivolumab induced dermatitis found to have TSH of 76.40 (July 20th). Notes that he will need immunotherapy for 2 years total. Pt was euthyroid prior to treatment. Pt has noted dryness in skin within the last 2 months which has improved. Evaluated by dermatology and being treated for dermatitis. Pt has gained about 30 pounds since Feb 2021. Pt is a vegan, h/o low B12 level. Pt eats a lot of fruit every morning, notes he has regular bowel movements per day.   Pertinent Labs:  TSH 7/2021 76.40  --> 13 8/2021 --> Nov 2021 2.44 --> 2.0 Dec 2021 --> 5.14 Dec 2021 --> 5.18 Jan 2022Jan 2022 Feb 2023 TSH 4.34 on LT4 137mcg QD.  FT4 <0.01 --> 1.3 --> Jan 1.5  TT3 <20 --> 98  TPO Ab 2166  TG Ab 87.3 Sodium 139, Potassium 4.5  Vitamin B12 <150 --> >2000 7/2021 --> 222 Oct 2021 --> 450 Dec 2021 Testosterone level is low, 171 (drawn 8am) --> 304  LH 7.9, FSH 13.  IGF-1 75  Prolactin 12.7  AM cortisol 18.1  ACTH 62.3   Interval hx:  PET Scan showed liver and spine T7 lesion. Pt had targeted radiation to spine at T7 level.  Pt fell down the stairs Jan 2025, bruised above pelvis. Went to urgent care due to pain.  Pt feels fatigued. Total Testosterone 287 --> 11.4, FSH 22.5, LH 16.1.  Prolactin 5.2 Feb 2025 TSH 0.17 on LT4 137mcg QD.  Above information updated as needed.

## 2025-02-27 NOTE — ASSESSMENT
[FreeTextEntry1] : The patient returns for follow-up.  He was last seen Thursday, December 19, 2024.  He has also stored to specimen for cryopreservation.  His blood studies have demonstrated a positive for treponema palladium antibody and a negative RPR and confirmatory testing.  He is also positive for CMV IgG but negative for CMV IgG M.  His hepatitis B core antibody was also positive..  He completed his immunotherapy and was noted to have a very low testosterone.  He feels his libido is low and his energy level a bit low also.  I have requested several blood studies and will review the role results in 2 weeks and decide what is the best treatment options for him.  Prior semen analysis demonstrated very low number of sperm with poor motility and morphology.  IVF with single sperm injection would be the best options for him.  He will follow-up as needed.  Telehealth Consultation: 35 minutes reviewing his history and discussing prior results, discussing various treatment options, writing requests for blood studies and reviewing his recent lab testing and writing his note. There was also additional time in preparing for the visit and assisting the patient with technology issues he was having with the telehealth platform.

## 2025-02-27 NOTE — HISTORY OF PRESENT ILLNESS
[FreeTextEntry1] : The patient-doctor. relationship has been established in a face-to-face fashion on real-time video audio HIPAA compliant communication using telemedicine software. The patient, Edward Robleswas at home and participated in the telephonic visit with the Physician Jeff Briscoe MD PhD who was in his medical office. The patient's identity has been confirmed.  The patient was previously emailed a copy of the telemedicine consent.  The patient has had a chance to review and has now given verbal consent and has requested care to be assessed and treated through telemedicine. The patient understands there may be limitations in this process and that they need not need further follow-up care in the office and/or hospital settings.   Verbal consent was given on Thursday,February 27, 2025 at 9 AM by the patient.  It was requested by the physician.  A written consent was previously sent for the patient to sign and return.  The patient returns for follow-up.  He was last seen Thursday, December 19, 2024.  He has also stored to specimen for cryopreservation.  His blood studies have demonstrated a positive for treponema palladium antibody and a negative RPR and confirmatory testing.  He is also positive for CMV IgG but negative for CMV IgG M.  His hepatitis B core antibody was also positive..  He completed his immunotherapy and was noted to have a very low testosterone.  He feels his libido is low and his energy level a bit low also.  PMH: Patient is a 60-year-old gentleman with a history of metastatic melanoma who presents with the chief complaint of impaired fertility for evaluation. The patient denies fevers, chills, nausea and/or vomiting and no unexplained weight loss.   I reviewed the questionnaire he had completed with him in detail.  His partner, age 33, was not able to accompany him to the visit today ([facility]). She has not had any prior pregnancies. As I understand her evaluation has been normal to date. They have been trying to achieve pregnancy for the past 6 months without conception. This issue causes both he and his partner significant distress.   He has no known drug allergies.  His past medical history demonstrates no significant urologic issues (see patient questionnaire).  In his present occupation as a he has no known toxin exposure.  He does not smoke and drinks only socially.  He has no known drug allergies.  His review of systems is non-contributory. His family history is not significant. A chaperone was offered to be present for the examination but declined by the patient.

## 2025-02-27 NOTE — LETTER BODY
[FreeTextEntry1] : Dear Dr. Mark Ayala,  Thank you for referring your patient Edward Robles for consultation for fertility concerns.  I have requested several baseline blood studies and a semen analysis. I will see the patient back in followup shortly and make further recommendations. I have attached a copy of my consultation note for your records.  Thank you again for this kind referral. I will certainly keep you updated with further progress. Please do not hesitate to call me if you have any questions.  Best regards,    Jeff Briscoe M.D., PhD Professor of Urology    [facility] [employer] [facility]  for , Reproductive and Sexual Medicine    [facility]

## 2025-02-28 NOTE — ASSESSMENT
[FreeTextEntry1] : 60 year M present with Mucositis     Flexible Laryngoscopy shows bilateral vocal fold symmetrical mobile, no gross mass or lesions in larynx. Airway patent. Dry Thick secretion coating larynx   Recommend: Oral /Larynx Mucositis   -Start Biotene dry mouth TID for dry mouth -OraMagic Mouth/Throat Suspension -Continue Oral hygiene, Brushing/Gargling  -Return to clinic in 4 months or sooner if new/worsen symptoms present

## 2025-02-28 NOTE — CONSULT LETTER
[Dear  ___] : Dear  [unfilled], [Consult Letter:] : I had the pleasure of evaluating your patient, [unfilled]. [Please see my note below.] : Please see my note below. [Consult Closing:] : Thank you very much for allowing me to participate in the care of this patient.  If you have any questions, please do not hesitate to contact me. [Sincerely,] : Sincerely, [FreeTextEntry2] : Dr. Ayala

## 2025-02-28 NOTE — HISTORY OF PRESENT ILLNESS
[de-identified] : 60 year old man presents for initial evaluation for dry mouth("cotton mouth") that started about 4 months ago. Referred by Dr. Ayala PMHx: Metastatic Melanoma currently on Opdivo(immunotherapy). States feels like the irriration and dryness is in the throat as well Patient denies dysphagia, odynophagia, dyspnea, dysphonia, hemoptysis or otalgia.

## 2025-02-28 NOTE — PHYSICAL EXAM
[Midline] : trachea located in midline position [Normal] : no abnormal secretions [Laryngoscopy Performed] : laryngoscopy was performed, see procedure section for findings [de-identified] : Sloughing Mucous membranes dry

## 2025-02-28 NOTE — HISTORY OF PRESENT ILLNESS
[de-identified] : 60 year old man presents for initial evaluation for dry mouth("cotton mouth") that started about 4 months ago. Referred by Dr. Ayala PMHx: Metastatic Melanoma currently on Opdivo(immunotherapy). States feels like the irriration and dryness is in the throat as well Patient denies dysphagia, odynophagia, dyspnea, dysphonia, hemoptysis or otalgia.

## 2025-02-28 NOTE — PHYSICAL EXAM
[Midline] : trachea located in midline position [Normal] : no abnormal secretions [Laryngoscopy Performed] : laryngoscopy was performed, see procedure section for findings [de-identified] : Sloughing Mucous membranes dry

## 2025-03-13 NOTE — HISTORY OF PRESENT ILLNESS
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  Oncologic History Patient noted development of a solitary hyperpigmented plaque over the right side of chest, which was gradually increasing in diameter with a slight change in color. Punch Biopsy on 3/2019. Excisional biopsy taken of lesion 9/13/2019. Wide local excision 11/13/2020: Pathology: Consistent with melanoma. Axilla lymph node: 1/1 consistent with melanoma. Thickness, ulceration status and IHC are not available at this time.  CT chest 9/2019: No evidence of metastatic disease 12/23/2020 CT head, chest and abdomen performed in the [facility]: CT head negative for metastatic disease; CT chest demonstrates an increase in the size of the previously seen 8x5 mm nodule in the upper outer right anterior chest which currently measures 14x10 mm.; CT abdomen no evidence of metastatic disease. CT chest - RLL 6 mm and LLL 3 mm (compared with CT 9/2019)  3/2/2021 Patient underwent Lung RLL wedge resection for an increasing lung nodule, pathology consistent with metstatsic melanoma   3/30/2021 Patient was started on Nivolumab for  metastatic melanoma:  CT chest 3/30/2021: In comparison with 12/9/2021, status post interval right lower lobe wedge resection. 6 mm right lower lobe nodule (series 2 image 96) is enlarged; previously 2 mm. The remaining 8 mm and smaller lung nodules annotated on series 2 are stable. Difficult to delineate asymmetric soft tissue density lateral to the right pectoral muscle (series 2 image 38) is not significantly changed in size but contains a new small focus of hypodensity (series 601B image 74), possibly postprocedural seroma.  9/18/2024 MRI Brain showed No evidence of brain metastases  9/18/2024 PET/CT showed FDG avid lesions as described above including gallbladder fossa and right T7 vertebral body  10/10/2024 MRI Thoracic spine Indeterminate enhancing T1 hypointense lesion within the right aspect of the T7 vertebral body, which exhibits FDG avidity on prior PET/CT dated 9/18/2024. No significant FDG avidity was seen in this region on prior PET/CT dated 2/9/2021. This lesion is suspicious for metastatic disease within the T7 vertebral body Indeterminate STIR hyperintense, possibly enhancing focus within the T10 vertebral body, measuring up to 3 mm, which may represent a tiny hemangioma versus metastatic disease within the T10 vertebral body. Attention on follow-up imaging is recommended.  10/18/2024 CT chest/abdomen/pelvis New hepatic segment 5 lesion which correlates with FDG avid focus seen in PET/CT, concerning for metastasis. There is a new lytic lesion in the T7 vertebral body which correlates with FDG avid focus seen in PET/CT, also concerning for metastasis.  10/21/24 Patient presents to discuss the role of radiation therapy in his care. Denies pain. Generally doing well.   11/19/24 Completed planned RT to Spine T7 total dose 1800 cGy in 1 fraction.   11/21/24 MRI RT Foot IMPRESSION: Findings concerning for an evolving stress fracture within the second proximal phalanx  1/06/25 CT Chest/Abdomen/Pelvis IMPRESSION: New metastatic portacaval lymph node. A few subcentimeter hypoattenuating liver lesions as detailed above, new from 10/17/2024, concerning for metastases. Previously seen 1.0 cm lesion in hepatic segment 5 is slightly decreased in size from 10/17/2024. T7 vertebral body lytic lesion is increased in size. No new pulmonary nodules or evidence of metastatic disease in the chest.  2/24/25 MRI Thoracic Spine IMPRESSION: T7 vertebral body lesion is again seen and slightly increased in size with minimal epidural extension of underlying process seen involving the right ventral epidural region.  2/03/25 PET/CT IMPRESSION: Compared to the 9/18/2024 FDG PET/CT: 1. Interval resolution of the previously noted hypermetabolic lateral right hepatic lobe focus, compatible for favorable response to therapy. 2. Interval resolution of the T7 vertebral hypermetabolic foci, with persistent lytic lesion. 3. New mild hypermetabolism to a new tiny 3 mm lytic/lucent focus within the right lateral eighth rib , worrisome for new metastasis. 4. NEW moderate right nonspecific hypermetabolism within nondisplaced fractures of the RIGHT seventh eighth and ninth posterior ribs; pathologic fractures are not excluded. New focal hypermetabolism is also visualized within the intercostal region/pleural space between the posterolateral right sixth and seventh ribs, indeterminant possibly sequela from rib trauma, potential pleural metastasis is not excluded. Attention on surveillance. 5. No focal hypermetabolism is visualized within the portacaval region; there is been interval decreased size of the enlarged portacaval node of seen on 1/6/2025 CT. 6. Intense hypermetabolism is now visualized within both testes, significantly increased FDG activity from prior PET, where there is mild to moderate activity; this is uncertain significance, potentially may be inflammatory; consider correlation with the Scrotal ultrasound. 7.  New moderate activity within an indeterminant left inguinal node, which has mildly intervally decreased size, but with mildly increased activity.  3/06/25 Patient presents today for follow up. Denies pain. Overall doing well.  Continues follow up with Med/Onc Dr. Ayala on 2/20/25  - continues on Nivolumab. Saw [facility] Dr. Schaffer on 2/27/25 - as per note Nasal endoscopy shows Right DNS, Moderate Inferior Turbinates Hypertrophy, No polyps, purulence or masses, Posterior Nasal pharynx Cobblestone/Clear Drainage, Moderate mucus production coating nasal cavity  Feeling well. Denies significant back pain at this time, just once in a while.

## 2025-03-13 NOTE — REVIEW OF SYSTEMS
[Fatigue: Grade 1 - Fatigue relieved by rest] : Fatigue: Grade 1 - Fatigue relieved by rest [Negative] : Eyes [FreeTextEntry4] : dry mouth [FreeTextEntry9] : once in a while has a little bit of back pain but not bad [de-identified] : had headaches which had subsided

## 2025-03-13 NOTE — REVIEW OF SYSTEMS
[Fatigue: Grade 1 - Fatigue relieved by rest] : Fatigue: Grade 1 - Fatigue relieved by rest [Negative] : Eyes [FreeTextEntry4] : dry mouth [FreeTextEntry9] : once in a while has a little bit of back pain but not bad [de-identified] : had headaches which had subsided

## 2025-03-13 NOTE — HISTORY OF PRESENT ILLNESS
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  Oncologic History Patient noted development of a solitary hyperpigmented plaque over the right side of chest, which was gradually increasing in diameter with a slight change in color. Punch Biopsy on 3/2019. Excisional biopsy taken of lesion 9/13/2019. Wide local excision 11/13/2020: Pathology: Consistent with melanoma. Axilla lymph node: 1/1 consistent with melanoma. Thickness, ulceration status and IHC are not available at this time.  CT chest 9/2019: No evidence of metastatic disease 12/23/2020 CT head, chest and abdomen performed in the [facility]: CT head negative for metastatic disease; CT chest demonstrates an increase in the size of the previously seen 8x5 mm nodule in the upper outer right anterior chest which currently measures 14x10 mm.; CT abdomen no evidence of metastatic disease. CT chest - RLL 6 mm and LLL 3 mm (compared with CT 9/2019)  3/2/2021 Patient underwent Lung RLL wedge resection for an increasing lung nodule, pathology consistent with metstatsic melanoma   3/30/2021 Patient was started on Nivolumab for  metastatic melanoma:  CT chest 3/30/2021: In comparison with 12/9/2021, status post interval right lower lobe wedge resection. 6 mm right lower lobe nodule (series 2 image 96) is enlarged; previously 2 mm. The remaining 8 mm and smaller lung nodules annotated on series 2 are stable. Difficult to delineate asymmetric soft tissue density lateral to the right pectoral muscle (series 2 image 38) is not significantly changed in size but contains a new small focus of hypodensity (series 601B image 74), possibly postprocedural seroma.  9/18/2024 MRI Brain showed No evidence of brain metastases  9/18/2024 PET/CT showed FDG avid lesions as described above including gallbladder fossa and right T7 vertebral body  10/10/2024 MRI Thoracic spine Indeterminate enhancing T1 hypointense lesion within the right aspect of the T7 vertebral body, which exhibits FDG avidity on prior PET/CT dated 9/18/2024. No significant FDG avidity was seen in this region on prior PET/CT dated 2/9/2021. This lesion is suspicious for metastatic disease within the T7 vertebral body Indeterminate STIR hyperintense, possibly enhancing focus within the T10 vertebral body, measuring up to 3 mm, which may represent a tiny hemangioma versus metastatic disease within the T10 vertebral body. Attention on follow-up imaging is recommended.  10/18/2024 CT chest/abdomen/pelvis New hepatic segment 5 lesion which correlates with FDG avid focus seen in PET/CT, concerning for metastasis. There is a new lytic lesion in the T7 vertebral body which correlates with FDG avid focus seen in PET/CT, also concerning for metastasis.  10/21/24 Patient presents to discuss the role of radiation therapy in his care. Denies pain. Generally doing well.   11/19/24 Completed planned RT to Spine T7 total dose 1800 cGy in 1 fraction.   11/21/24 MRI RT Foot IMPRESSION: Findings concerning for an evolving stress fracture within the second proximal phalanx  1/06/25 CT Chest/Abdomen/Pelvis IMPRESSION: New metastatic portacaval lymph node. A few subcentimeter hypoattenuating liver lesions as detailed above, new from 10/17/2024, concerning for metastases. Previously seen 1.0 cm lesion in hepatic segment 5 is slightly decreased in size from 10/17/2024. T7 vertebral body lytic lesion is increased in size. No new pulmonary nodules or evidence of metastatic disease in the chest.  2/24/25 MRI Thoracic Spine IMPRESSION: T7 vertebral body lesion is again seen and slightly increased in size with minimal epidural extension of underlying process seen involving the right ventral epidural region.  2/03/25 PET/CT IMPRESSION: Compared to the 9/18/2024 FDG PET/CT: 1. Interval resolution of the previously noted hypermetabolic lateral right hepatic lobe focus, compatible for favorable response to therapy. 2. Interval resolution of the T7 vertebral hypermetabolic foci, with persistent lytic lesion. 3. New mild hypermetabolism to a new tiny 3 mm lytic/lucent focus within the right lateral eighth rib , worrisome for new metastasis. 4. NEW moderate right nonspecific hypermetabolism within nondisplaced fractures of the RIGHT seventh eighth and ninth posterior ribs; pathologic fractures are not excluded. New focal hypermetabolism is also visualized within the intercostal region/pleural space between the posterolateral right sixth and seventh ribs, indeterminant possibly sequela from rib trauma, potential pleural metastasis is not excluded. Attention on surveillance. 5. No focal hypermetabolism is visualized within the portacaval region; there is been interval decreased size of the enlarged portacaval node of seen on 1/6/2025 CT. 6. Intense hypermetabolism is now visualized within both testes, significantly increased FDG activity from prior PET, where there is mild to moderate activity; this is uncertain significance, potentially may be inflammatory; consider correlation with the Scrotal ultrasound. 7.  New moderate activity within an indeterminant left inguinal node, which has mildly intervally decreased size, but with mildly increased activity.  3/06/25 Patient presents today for follow up. Denies pain. Overall doing well.  Continues follow up with Med/Onc Dr. Ayala on 2/20/25  - continues on Nivolumab. Saw [facility] Dr. Schaffer on 2/27/25 - as per note Nasal endoscopy shows Right DNS, Moderate Inferior Turbinates Hypertrophy, No polyps, purulence or masses, Posterior Nasal pharynx Cobblestone/Clear Drainage, Moderate mucus production coating nasal cavity  Feeling well. Denies significant back pain at this time, just once in a while.

## 2025-03-13 NOTE — REVIEW OF SYSTEMS
[Fatigue: Grade 1 - Fatigue relieved by rest] : Fatigue: Grade 1 - Fatigue relieved by rest [Negative] : Eyes [FreeTextEntry4] : dry mouth [FreeTextEntry9] : once in a while has a little bit of back pain but not bad [de-identified] : had headaches which had subsided

## 2025-03-13 NOTE — HISTORY OF PRESENT ILLNESS
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  Oncologic History Patient noted development of a solitary hyperpigmented plaque over the right side of chest, which was gradually increasing in diameter with a slight change in color. Punch Biopsy on 3/2019. Excisional biopsy taken of lesion 9/13/2019. Wide local excision 11/13/2020: Pathology: Consistent with melanoma. Axilla lymph node: 1/1 consistent with melanoma. Thickness, ulceration status and IHC are not available at this time.  CT chest 9/2019: No evidence of metastatic disease 12/23/2020 CT head, chest and abdomen performed in the [facility]: CT head negative for metastatic disease; CT chest demonstrates an increase in the size of the previously seen 8x5 mm nodule in the upper outer right anterior chest which currently measures 14x10 mm.; CT abdomen no evidence of metastatic disease. CT chest - RLL 6 mm and LLL 3 mm (compared with CT 9/2019)  3/2/2021 Patient underwent Lung RLL wedge resection for an increasing lung nodule, pathology consistent with metstatsic melanoma   3/30/2021 Patient was started on Nivolumab for  metastatic melanoma:  CT chest 3/30/2021: In comparison with 12/9/2021, status post interval right lower lobe wedge resection. 6 mm right lower lobe nodule (series 2 image 96) is enlarged; previously 2 mm. The remaining 8 mm and smaller lung nodules annotated on series 2 are stable. Difficult to delineate asymmetric soft tissue density lateral to the right pectoral muscle (series 2 image 38) is not significantly changed in size but contains a new small focus of hypodensity (series 601B image 74), possibly postprocedural seroma.  9/18/2024 MRI Brain showed No evidence of brain metastases  9/18/2024 PET/CT showed FDG avid lesions as described above including gallbladder fossa and right T7 vertebral body  10/10/2024 MRI Thoracic spine Indeterminate enhancing T1 hypointense lesion within the right aspect of the T7 vertebral body, which exhibits FDG avidity on prior PET/CT dated 9/18/2024. No significant FDG avidity was seen in this region on prior PET/CT dated 2/9/2021. This lesion is suspicious for metastatic disease within the T7 vertebral body Indeterminate STIR hyperintense, possibly enhancing focus within the T10 vertebral body, measuring up to 3 mm, which may represent a tiny hemangioma versus metastatic disease within the T10 vertebral body. Attention on follow-up imaging is recommended.  10/18/2024 CT chest/abdomen/pelvis New hepatic segment 5 lesion which correlates with FDG avid focus seen in PET/CT, concerning for metastasis. There is a new lytic lesion in the T7 vertebral body which correlates with FDG avid focus seen in PET/CT, also concerning for metastasis.  10/21/24 Patient presents to discuss the role of radiation therapy in his care. Denies pain. Generally doing well.   11/19/24 Completed planned RT to Spine T7 total dose 1800 cGy in 1 fraction.   11/21/24 MRI RT Foot IMPRESSION: Findings concerning for an evolving stress fracture within the second proximal phalanx  1/06/25 CT Chest/Abdomen/Pelvis IMPRESSION: New metastatic portacaval lymph node. A few subcentimeter hypoattenuating liver lesions as detailed above, new from 10/17/2024, concerning for metastases. Previously seen 1.0 cm lesion in hepatic segment 5 is slightly decreased in size from 10/17/2024. T7 vertebral body lytic lesion is increased in size. No new pulmonary nodules or evidence of metastatic disease in the chest.  2/24/25 MRI Thoracic Spine IMPRESSION: T7 vertebral body lesion is again seen and slightly increased in size with minimal epidural extension of underlying process seen involving the right ventral epidural region.  2/03/25 PET/CT IMPRESSION: Compared to the 9/18/2024 FDG PET/CT: 1. Interval resolution of the previously noted hypermetabolic lateral right hepatic lobe focus, compatible for favorable response to therapy. 2. Interval resolution of the T7 vertebral hypermetabolic foci, with persistent lytic lesion. 3. New mild hypermetabolism to a new tiny 3 mm lytic/lucent focus within the right lateral eighth rib , worrisome for new metastasis. 4. NEW moderate right nonspecific hypermetabolism within nondisplaced fractures of the RIGHT seventh eighth and ninth posterior ribs; pathologic fractures are not excluded. New focal hypermetabolism is also visualized within the intercostal region/pleural space between the posterolateral right sixth and seventh ribs, indeterminant possibly sequela from rib trauma, potential pleural metastasis is not excluded. Attention on surveillance. 5. No focal hypermetabolism is visualized within the portacaval region; there is been interval decreased size of the enlarged portacaval node of seen on 1/6/2025 CT. 6. Intense hypermetabolism is now visualized within both testes, significantly increased FDG activity from prior PET, where there is mild to moderate activity; this is uncertain significance, potentially may be inflammatory; consider correlation with the Scrotal ultrasound. 7.  New moderate activity within an indeterminant left inguinal node, which has mildly intervally decreased size, but with mildly increased activity.  3/06/25 Patient presents today for follow up. Denies pain. Overall doing well.  Continues follow up with Med/Onc Dr. Ayala on 2/20/25  - continues on Nivolumab. Saw [facility] Dr. Schaffer on 2/27/25 - as per note Nasal endoscopy shows Right DNS, Moderate Inferior Turbinates Hypertrophy, No polyps, purulence or masses, Posterior Nasal pharynx Cobblestone/Clear Drainage, Moderate mucus production coating nasal cavity  Feeling well. Denies significant back pain at this time, just once in a while.

## 2025-03-19 NOTE — REASON FOR VISIT
[Follow-Up Visit] : a follow-up visit for [FreeTextEntry2] : ROUTINE FOOT CARE [Foot Pain] : foot pain

## 2025-03-19 NOTE — PHYSICAL EXAM
[General Appearance - Alert] : alert [General Appearance - In No Acute Distress] : in no acute distress [No Joint Swelling] : no joint swelling [] : normal strength/tone [Normal Foot/Ankle] : Both lower extremities were exposed and visualized. Standing exam demonstrates normal foot posture and alignment. Hindfoot exam shows no hindfoot valgus or varus [FreeTextEntry1] : Patient with tinea pedis onychomycosis and moderate ingrown toenails.

## 2025-03-19 NOTE — ASSESSMENT
[FreeTextEntry1] : Patient has ingrown nails we discussed surgical excision patient would like to take a more conservative course of care curettage corners slant back performed no anesthesia necessary packed with cotton recommend twice daily soaks.  Patient also with thick yellow fungal toenails both big toes discussed oral medication patient would rather take topical course of care.  We also discussed class IV laser patient also has a history of tinea pedis recommend shoe gear changes and hygiene I recommend twice daily powder.  Follow-up 10 weeks Patient's nails are slightly discolored there is also may be from the nail fungus or from treatments for other conditions patient with mild ingrown's mild fungal nails no surgery necessary no oral medication necessary.

## 2025-03-20 NOTE — REVIEW OF SYSTEMS
[Fever] : no fever [Chills] : no chills [Night Sweats] : no night sweats [Fatigue] : no fatigue [Dysphagia] : no dysphagia [Odynophagia] : no odynophagia [Chest Pain] : no chest pain [Palpitations] : no palpitations [Shortness Of Breath] : no shortness of breath [Wheezing] : no wheezing [Cough] : no cough [Abdominal Pain] : no abdominal pain [Vomiting] : no vomiting [Constipation] : no constipation [Diarrhea: Grade 0] : Diarrhea: Grade 0 [Dysuria] : no dysuria [Skin Rash] : no skin rash

## 2025-03-20 NOTE — PHYSICAL EXAM
[Normal] : affect appropriate [de-identified] : dry MM [de-identified] : breathing comfortably on room air

## 2025-03-20 NOTE — HISTORY OF PRESENT ILLNESS
[de-identified] : Mr. Robles is a 56 year old gentlemen with no significant past medical history presenting to the office for an initial consultation of melanoma.  Patient has been in the [facility] for 6 years. Prior to that he lived in [place] for investment reports for [employer]. Lived in most of [place] for over 20 years.  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 Has been an athlete for many decades. He is vegan. He swims a up to a couple miles daily.  He is open to complementary medicine, and meditation.   Patient noted development of a solitary hyperpigmented plaque over the right side of chest, which was gradually increasing in diameter with a slight change in color. Patient has history of significant sun exposure and was not fond of using sun protection. Punch Biopsy on 3/2019. Excisional biopsy taken of lesion 9/13/2019. Wide local excision 11/13/2020: Pathology: Consistent with melanoma. Axilla lymph node: 1/1 consistent with melanoma. Thickness, ulceration status and IHC are not available at this time.  CT chest 9/2019: No evidence of metastatic disease 12/23/2020 CT head, chest and abdomen performed in the [facility]: CT head negative for metastatic disease; CT chest demonstrates an increase in the size of the previously seen 8x5 mm nodule in the upper outer right anterior chest which currently measures 14x10 mm.; CT abdomen no evidence of metastatic disease. CT chest - RLL 6 mm and LLL 3 mm (compared with CT 9/2019).   2/19/21: I discussed PET scan and CT with radiologist. Lung lesions too small to confirm melanoma by CT biopsy. Thoracic surgeon would need to be consulted. I called Dr. Woodard, and he believes that the lesion in the right lung is accessible surgically. Also he thinks that there is a possibility of infection as opposed to melanoma. Patient is aware of this and is making an appointment. Patient slides obtained, and confirmed Stage 3b. We discussed that if the lungs are negative for melanoma, then would recommend 1 year adjuvant Opdivo as adjuvant.  3/12/2021 Patient here to review pathology, labs, films. He feels well and is able to play tennis  3/30/2021: 1) metastatic melanoma: Patient is here for C1 NIvolumab.He is feeling well physically and psychologically and ready to start treatment.  We re-reviewed logistics, mechanism of action and most common irAEs from Nivolumab. CT chest 3/30/2021: In comparison with 12/9/2021, status post interval right lower lobe wedge resection. 6 mm right lower lobe nodule (series 2 image 96) is enlarged; previously 2 mm. The remaining 8 mm and smaller lung nodules annotated on series 2 are stable. Difficult to delineate asymmetric soft tissue density lateral to the right pectoral muscle (series 2 image 38) is not significantly changed in size but contains a new small focus of hypodensity (series 601B image 74), possibly postprocedural seroma. 2) COVID-19 infection: Patient was tested positive for COVID-19 on 3/16.  He is asymptomatic and denies fever, chills, change in taste/smell, cough, SOB , diarrhea or fatigue. 3) Vaccine: Rah and Rah vaccine was given on 3/25/2021.   4/14/2021: 1) Metastatic melanoma: Patient is here for toxicity evaluation s/p one cycle of Nivolumab on 3/30/2021.  Patient did well and reports no significant irAEs.  Denies fever, chills, fatigue, arthralgia, myalgia, cough, rash, diarrhea, cough or chest pain. 2) Facial burning: Patient underwent Blue light procedure on 4/13/2021 with Dermatology.  He noticed "burning" on his face a couple of hour after his procedure which is causing some irritation.  He is scheduled to be evaluated by dermatology today.  5/11/2021: Metastatic melanoma: Patient is here for toxicity evaluation s/p two cycle of Nivolumab. Patient did well and reports no significant irAEs.  Denies fever, chills, fatigue, arthralgia, myalgia, cough, rash, diarrhea, cough or chest pain. COVID side effects have improved and completely resolved. No irAEs Has started swimming again Reimage after C#4  5/25/2021: 1) melanoma: Patient is here for C3 Nivolumab.  He is doing well and reports no significant irAEs.   Denies fever, chills, fatigue, arthralgia, myalgia, cough, rash, diarrhea, cough or chest pain. Patient admits to grade one fatigue which he is able to push through.  No restrictions with his ADLs.  He is able to swim. We reviewed labs, no acute abnormality noted.  WIll recheck TSH levels today.  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 patient had covid and is feeling better but not at baseline - he is able to swim long distances but notes he gets fatigued at times when walks distances Due for CT scan next week.  9/14/2021: 1) Melanoma: Patient is here for C7 Nivolumab. Patient is doing well on treatment and voices no new complaints. Denies any significant irAEs. Denies fever, chills,  cough, SOB, arthralgia, myalgia, diarrhea. 2) checkpoint inhibitor induced dermatitis, : Has improved. Currently not using any ointments or cream.  Patient has been moisturizer. 3) Hypothyroid: Patient was evaluated by Endocrine, Dr. Martell who recommended Levothyroxine 125mcg Her thyroid functions are improving  4) Social: Patient is currently living at apartment close to [facility] house. He is currently not working.  11/9/2021: 1) Melanoma: Patient is here for C9 Nivolumab. Patient is doing well on treatment and voices no new complaints. Denies any significant irAEs today.  Denies fever, chills, cough, SOB, arthralgia, myalgia, diarrhea. 2) checkpoint inhibitor induced dermatitis: Has improved on phototherapy & topical steroids not currently using any ointments or cream.  Patient has been moisturizer. 3) Hypothyroid: Patient was evaluated by Endocrine, Dr. Martell who recommended Levothyroxine 137 mcg Her thyroid functions are improving. 4) Right upper quadrant pain: Patient admits to pain in the right lateral abdominal area x few months. Will have radiology review imaging from 10/2021. Appears to be musculoskeletal in nature.   1) Melanoma:  C12 Nivolumab. He is tolerating treatment well and reports no significant irAEs. Labs reviewed with patient on 1/4/2022, no acute abnormality. CT chest, abdomen/pelvis ordered. He has no restrictions with his ADLs. He is now ice skating. Diet and weight remain stable. He is being followed by Dr. 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  Currently on Levothyroxine 137 mcg.  3/1/2022 1)Melanoma C13 Nivolumab today. He is tolerating treatment extremely well and reports no significant irAEs. Patient underwent CT chest, abdomen/pelvis on 2/15/2022 which demonstrated the previously noted right lower lobe nodule no longer visualized. Otherwise, stable exam. He was evaluated by endocrine for hypothyroid, patient is now on Levothyroxine 150 mcg daily. He has no restrictions with his ADLs. Patient admits to grade one fatigue but is able to push through . He is able to swim ~ 2 miles/day.  Continues UV q 2 weeks, and will discuss stopping it. Notes dry mouth and taste.  3/29/2022: C14 Nivolumab today. He is tolerating treatment relatively well and reports no significant irAEs. He has no restrictions with his ADLs. Labs reviewed and LFT's are trending upwards. Grade one fatigue but is able to push through. He is currently on Levothyroxine 150 mcg daily and doing well. He is adjusting to the dose and will not start Cytomel at this time. Due to dry mouth Sjogren syndrome labs were ordered however not performed last visit, and will do so this time. He does note improved taste after a course of clotrimazole.  4/26/2022: C15 Nivolumab today. Labs reviewed, no acute abnormality. Thyroid functions remain WNL. Sjogren labs performed due to dry mouth, labs WNL. Tolerating treatment extremely well and reports on significant irAEs. Danny (Signatera) performed on 3/1/2022: Signatera Positive ; MTM/mL: 0.65  5/24/22 Patient will have C16 nivolumab. Repeat ct DNA next cycle. If positive, get PET. If negative, consider a few more treatments and stop or complete 2 year course. Thyroid issue seems to be addressed. Phototherapy is now once a month. Had stopped and then restarted due to increase in dermatitis. Mental state is positive. Remains underactive in terms of exercise. Has been supporting his father and family with medical issues.  7/19/2022 C18 Nivolumab today. Patient is tolerating treatment well and reports no significant irAEs. Patient is no longer receiving light therapy from dermatology. Patient is currently on Levothyroxine from 150 mcg to 137 mcg. Admits to grade one fatigue intermittently but has improved. He is now back to baseline. He continues to swim and has no restrictions with his ADLs.  8/16/22 C19 Nivolumab Doing well overall. Lisa has converted to negative on 7/19/22 Will continue CT scan q 4 months. Needs full TFTs today. Able to swim for long periods of time. Stopped phototherapy for  checkpoint inhibitor induced dermatitis. His only complaint is ear scaling.  9/15/2022: C20 Nivolumab on 9/13/2022 He is doing well on treatment and voices no major irAEs. Grade one fatigue but is able to push through. We reviewed his CT chest, abdomen/pelvis on 8/2022. Next CT 12/2022. Lisa on draw#2 negative on 7/29/2022 Next draw 10/2022.  10/07/2022 S/P C20 Nivolumab on 09/13/2022, and next cycle will be on 10/11/2022 He c/o pain to left side lower back. Also, he has some fatigue. His ear infection has cleared with use of medicated drops. CT-Scan of chest of 8/25/2022 showed slight increase of right chest wall nodule. Will get US and possible biopsy of nodule to r/o abnormal pathology.  11/08/2022: C22 Nivolumab today. US biopsy of chest wall negative on 10/2022. Patient is feeling well today and voices no major irAEs. Admits to grade one fatigue intermittently however is able to push through. Danny franklin D#3 today. Intermittent rash on his lower back and forehead.  He is using Triamcinolone on his lower back and Metronidazole on his forehead.  12/6/22 Patient here for C23 Will complete one more dose and then start surveillance with scans and ct-DNA q 3 months. Will obtain lab monitoring at home. He dose still have fatigue and thinks that it is related to low testosterone. Has not been sexually active.  01/03/2023 C24 Nivolumab today. Lisa Delgado D#3 on 11/2022 negative. D#4 02/2023 CT chest, abdomen/pelvis on 12/15/2022 which revealed no evidence of recurrent and/or metastatic disease. He is tolerating treatment relatively well and reports no major irAEs. Labs reviewed. Hormones are WNL.  01/31/2023: C25 Nivolumab today. Will treat for 26 cycles and stop. No major irAEs. D#4 Signatera on 01/03/2023 negative. He is doing well and voices no major complaints. Patient has gained weight while on treatment. Has not been exercising as often.  02/28/2023 C26 Nivolumab today (last treatment) No major irAEs. CT chest, abdomen/pelvis on 03/02/2023. He was seen by cardiology (Dr. Bingham) due to CORLEY. He underwent ECG and TTE which revealed no acute abnormality. His EF is 59%, normal LV systolic function. His exercise induced stress test did not reveal any acute abnormality. He was advised to undergo CT chest (calcium score) and will schedule.  07/07/2023 He is here for his follow up for metastatic melanoma. He was seen by Dr. Zaragoza (Ortho) for bilateral foot pain, R>L and swelling x 3 weeks. Patient has been doing PT and home stretch exercises which has been helping. His last signatera on 04/2023 was negative. Will repeat today. Last CT on 03/2023 stable. Next CT on 09/2023. Patient continues to have issues with his energy level. He is resting more and not as active however has no restrictions with his routine/instrumental ADLs. Patient has gained weight.  3/7/2024 Signatera Danny 2/19/2024 = 0.00 CT 2/21/2024 LUNGS AND LARGE AIRWAYS: Patent central airways. Stable submillimeter right upper lobe pulmonary nodule on image 27 of series 303. Left lower lobe tiny nodules bilaterally seen on the current exam. Patient is again noted to be post right lower lobe wedge resection PLEURA: No pleural effusion. VESSELS: Within normal limits. HEART: Heart size is normal. No pericardial effusion. MEDIASTINUM AND BRIDGER: No lymphadenopathy. CHEST WALL AND LOWER NECK: Scattered subcutaneous nodules similar in appearance to prior study. Representative nodule on image 29 of series 303 measures 7 mm and is unchanged. Right axillary scarring is also unchanged. Right sided mild gynecomastia is stable as well. LIVER: Within normal limits. BILE DUCTS: Normal caliber. GALLBLADDER: Within normal limits. SPLEEN: Unremarkable PANCREAS: Within normal limits. ADRENALS: Within normal limits. KIDNEYS/URETERS: Within normal limits. BLADDER: Minimally distended. REPRODUCTIVE ORGANS: Prostate within normal limits. BOWEL: No bowel obstruction. Appendix is normal. A small hiatal hernia is seen with mild colonic diverticulosis without diverticulitis PERITONEUM: No ascites VESSELS: Within normal limits. RETROPERITONEUM/LYMPH NODES: No lymphadenopathy. Prominent bilateral inguinal lymph nodes are unchanged. ABDOMINAL WALL: 2 small fat-containing inguinal hernias. Small midline fat-containing ventral hernia. BONES: Degenerative changes. IMPRESSION: Stable examination.  Feeling well and voices no new complaints. Addressed all questions. Looking to lose some weight. Plans to go to the [facility]. He is looking to take care of his parents more here, and work with [facility] remotely.  7/1/24 Last Signatera Danny on 6/12 is not detected. Will repeat in October 2024. Feeling well and voices no major complaints. 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  10/3/2024 Patient with h/o melanoma just in lungs. He was on treatment for 2 years. Had imaging done at [facility]. I had it loaded. Will ask radiology to review the PET - there is a liver and spine T7 lesion. Had rise of Signatera.  10/23/24 He is for C1 Ipilimumab (3) + Nivolumab (1) Was evaluated by Dr. Jeff Briscoe (Urology) for sperm banking. Patient wanted to discuss his labs prior to receiving therapy. He was educated no contraindications to receive therapy with positive Hb AB. He was positive in the past (2/2021) and had negative antigens. He was told his syphillis titers were positive. Unable to located in his chart. Requested labs be sent over to our team. He will need to be seen one week prior to C2 for toxicity evaluation and labs.  11/6/24 Patient is here for toxicity evaluation s/p 1 cycle of Ipilimumab (3) + Nivolumab (1). Did well and voices no major irAEs. Mild headache one day which has subsided Labs today. 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 Mild fatigue but is able to push through.  11/25/24 S/P 2 cycles of Ipi 3 + Nivo 1 on 11/13. C3 is scheduled for 12/4. Patient completed SRS to T spine (T7) in 1 fraction on 11/19/24. Labs today. Doing well, no major irAEs. Admits to soreness in the T7 region after radiation. No major pain. Admits to dry mouth and was given Clotrimazole with relief.  12/19/24 Patient is here s/p 3 cycles of Ipi 3 and Nivo 1. C4 is scheduled for 12/26/24. Patient endorses headache in the back of his head and frontal lobe. It has been more consistent over the last 5-7 days. No triggering factors a/w pain. No trauma to area. Pain is very mild and abates without acute intervention. Does not wake him up in the middle of the night. No change in mental status. No history of migraines in the past.  1/29/25 He is s/p 1 cycle of Nivolumab 480 mg/dose on 1/23/25 He is s/p mechanical fall a few weeks and was seen by urgent care. Patient recalled the incident and did NOT his head. He landed on his right lower back/buttock area + right side of his body. He was able to get up and perform his routine/instrumental ADLs afterwards. Three days later patient was having severe pain in the right rib and went to urgent care. XRAYs were negative. He was given pain medication/muscle relaxers. PET/CT scheduled for 2/3/25  02/20/25 Reports occasional back soreness which is improving Has MRI cervical spine in a few days  Had thrush but thinks it has improved; it numbed his mouth so he stopped taking; currently has dry lips/mouth, inside of mouth feels burned like after eating something hot, taste feels different Skin feels very good, no rashes During last treatment had some prickling/small red dots in chest but resolved No headaches for over a month Occasional fatigue C2 nivolumab today Losing some weight intentionally from being busier; eating vegan and less processed foods, drinking more infused water  3/20/25 C7 Nivolumab at 480 mg/dose. Signatera on 2/12/25 - not detected Doing well. Dry mouth has improved. Taste is returning. Grade one fatigue but is able to push through. 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 Was seen by Dr. Sanchez due to his RT to T7 lesion. Completed SBRT to the T spine cgy in 1 fraction 11/19/2024. Repeat MR T spine in mid June 2025.  [de-identified] : Surgical Oncology: Santos Seals\par  Thoracic Surgeon: Gatito Woodard \par  \par  Pt's sister Yohana Baca is best contact:  [phone]\par  \par  Patient sister home: [phone]\par  Patient new cell: [phone]\par  Back up number - [phone]

## 2025-03-20 NOTE — ASSESSMENT
[FreeTextEntry1] : Patient has melanoma on right chest resected 11/2020. A positive right axillary node also found. On CT scan 12/2020 there are 2 subcentimeter nodules that are reported to be new since 9/2019. Patient has returned from [facility] for further evaluation and treatment. This is considered Stage 3b. PET and CT are reviewed today. The CT shows nodules, and patient underwent biopsy with Dr Woodard.   Nivolumab  C1 3/30/2021. C26 02/28/2023 - last treatment.  Danny (Signatera) performed on 3/1/2022: Signatera Positive had converted to negative on 7/19/2022.  Then it turned positive again August 29, 2024 ctDNA = 10.11  Patient with h/o melanoma just in lungs. He was on treatment for 2 years. Had imaging done at [facility]. I had it loaded. Will ask radiology to review the PET - there is a liver and spine T7 lesion. Had rise of Signatera.  IPI 3 + NIVO 1 C1D1 10/23 C2 11/13 C3 12/4 C4 12/26 C1 maintenance Nivolumab 1/23/25 He is s/p 1 cycle of Nivolumab 480 mg/dose on 1/23/25 C2 maintenance nivolumab 2/20/25 C7 3/20/25  2/2025 PET/CT showed very good response.  Siganatera  Feb 20, 2025 0.00 Feb 12, 2025 0.00 Dec 26, 2024 45.47 Nov 25, 2024 43.55 Aug 29, 2024 10.11 Jun 12, 2024 0.00 Feb 19, 2024 0.00 Nov 28, 2023 0.00 Jul 07, 2023 0.00 Apr 07, 2023 0.00 Feb 28, 2023 0.00 Jan 03, 2023 0.00 Nov 08, 2022 0.00 Jul 19, 2022 0.00 Mar 01, 2022 0.65  Reviewed recent PET/CT scan with Mr. Robles, showing there is response, and therefore we will continue maintenance nivolumab.   C7 Nivolumab at 480 mg/dose.  Signatera on 2/12 and 2/20 - not detected Will repeat in April 2025  Was seen by Dr. Sanchez due to his RT to T7 lesion. Completed SBRT to the T spine cgy in 1 fraction 11/19/2024. Repeat MR T spine in mid June 2025.  Hypothyroid:  Lowered Levothyroxine 137 mcg to 100 mcg daily.  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 Jeff Paez US testes negative.  Last imaging with PET/CT ON 2/3/2025. Will repeat CT in 3-4 months.  f/u q cycle.  Dr. Gilliland agrees with above plan.

## 2025-03-27 NOTE — HISTORY OF PRESENT ILLNESS
[FreeTextEntry1] : The patient-doctor. relationship has been established in a face-to-face fashion on real-time video audio HIPAA compliant communication using telemedicine software. The patient, Edward Robleswas at home and participated in the telephonic visit with the Physician Jeff Briscoe MD PhD who was in his medical office. The patient's identity has been confirmed.  The patient was previously emailed a copy of the telemedicine consent.  The patient has had a chance to review and has now given verbal consent and has requested care to be assessed and treated through telemedicine. The patient understands there may be limitations in this process and that they need not need further follow-up care in the office and/or hospital settings.   Verbal consent was given on Thursday, March 27, 2025 at 9 AM by the patient.  It was requested by the physician.  A written consent was previously sent for the patient to sign and return.  The patient returns for follow-up.  He was last seen February 27th 2025 he has also stored to specimen for cryopreservation.  He has blood studies that were done after his last visit to review.  His blood studies have demonstrated a positive for treponema palladium antibody and a negative RPR and confirmatory testing.  He is also positive for CMV IgG but negative for CMV IgG M.  His hepatitis B core antibody was also positive..  He completed his immunotherapy and was noted to have a very low testosterone.  He feels his libido is low and his energy level a bit low also.  PMH: Patient is a 60-year-old gentleman with a history of metastatic melanoma who presents with the chief complaint of impaired fertility for evaluation. The patient denies fevers, chills, nausea and/or vomiting and no unexplained weight loss.   I reviewed the questionnaire he had completed with him in detail.  His partner, age 33, was not able to accompany him to the visit today ([facility]). She has not had any prior pregnancies. As I understand her evaluation has been normal to date. They have been trying to achieve pregnancy for the past 6 months without conception. This issue causes both he and his partner significant distress.   He has no known drug allergies.  His past medical history demonstrates no significant urologic issues (see patient questionnaire).  In his present occupation as a he has no known toxin exposure.  He does not smoke and drinks only socially.  He has no known drug allergies.  His review of systems is non-contributory. His family history is not significant. A chaperone was offered to be present for the examination but declined by the patient.

## 2025-03-27 NOTE — ASSESSMENT
[FreeTextEntry1] : The patient returns for follow-up.  He was last seen February 27th 2025 he has also stored to specimen for cryopreservation.  He has blood studies that were done after his last visit to review.  His blood studies have demonstrated a positive for treponema palladium antibody and a negative RPR and confirmatory testing.  He is also positive for CMV IgG but negative for CMV IgG M.  His hepatitis B core antibody was also positive..  He completed his immunotherapy and was noted to have a very low testosterone.  He feels his libido is low and his energy level a bit low also.  Blood studies dated February 27, 2025 demonstrated a estradiol of 20 pg/mL, LH 17.7 IU/L, PSA 2.58 ng/mL, TSH 0.32 IU/mL, prolactin 5.9 ng/mL, FSH 25.5 IU/L, hemoglobin A1c 5.6%, testosterone free 0.0 pg/mL with a total testosterone of 36.8 ng/dL, testosterone bioavailable were also low.  He has some symptoms of low testosterone.  Nonetheless, he is interested in fertility and I have discussed with him starting on clomiphene citrate.  I will start him on 25 mg a day and obtain blood studies in 3 weeks and discussed the results with him 2 weeks after that.  He will also reach out to Dr. Ignacio for his increase in PSA which might be related from his therapy.  He stated his father has BPH and was treated for it.  I have discussed with the patient the potential increase in PSA with treatment with clomiphene citrate.  Prior semen analysis demonstrated very low number of sperm with poor motility and morphology.  IVF with single sperm injection would be the best options for him.    Telehealth Consultation: 35 minutes reviewing his history and discussing prior results, discussing various treatment options, writing prescriptions, writing requests for blood studies and reviewing his recent lab testing and writing his note. There was also additional time in preparing for the visit and assisting the patient with technology issues he was having with the telehealth platform.

## 2025-03-27 NOTE — HISTORY OF PRESENT ILLNESS
[FreeTextEntry1] : The patient-doctor. relationship has been established in a face-to-face fashion on real-time video audio HIPAA compliant communication using telemedicine software. The patient, Edward Robleswas at home and participated in the telephonic visit with the Physician Jeff Briscoe MD PhD who was in his medical office. The patient's identity has been confirmed.  The patient was previously emailed a copy of the telemedicine consent.  The patient has had a chance to review and has now given verbal consent and has requested care to be assessed and treated through telemedicine. The patient understands there may be limitations in this process and that they need not need further follow-up care in the office and/or hospital settings.   Verbal consent was given on Thursday, March 27, 2025 at 9 AM by the patient.  It was requested by the physician.  A written consent was previously sent for the patient to sign and return.  The patient returns for follow-up.  He was last seen February 27th 2025 he has also stored to specimen for cryopreservation.  He has blood studies that were done after his last visit to review.  His blood studies have demonstrated a positive for treponema palladium antibody and a negative RPR and confirmatory testing.  He is also positive for CMV IgG but negative for CMV IgG M.  His hepatitis B core antibody was also positive..  He completed his immunotherapy and was noted to have a very low testosterone.  He feels his libido is low and his energy level a bit low also.  PMH: Patient is a 60-year-old gentleman with a history of metastatic melanoma who presents with the chief complaint of impaired fertility for evaluation. The patient denies fevers, chills, nausea and/or vomiting and no unexplained weight loss.   I reviewed the questionnaire he had completed with him in detail.  His partner, age 33, was not able to accompany him to the visit today ([facility]). She has not had any prior pregnancies. As I understand her evaluation has been normal to date. They have been trying to achieve pregnancy for the past 6 months without conception. This issue causes both he and his partner significant distress.   He has no known drug allergies.  His past medical history demonstrates no significant urologic issues (see patient questionnaire).  In his present occupation as a he has no known toxin exposure.  He does not smoke and drinks only socially.  He has no known drug allergies.  His review of systems is non-contributory. His family history is not significant. A chaperone was offered to be present for the examination but declined by the patient.

## 2025-03-27 NOTE — LETTER BODY
[FreeTextEntry1] : Dear Dr. Mark Ayala,  Thank you for referring your patient Edward Robles for consultation for fertility concerns.  I have requested several baseline blood studies and a semen analysis. I will see the patient back in followup shortly and make further recommendations. I have attached a copy of my consultation note for your records.  Thank you again for this kind referral. I will certainly keep you updated with further progress. Please do not hesitate to call me if you have any questions.  Best regards,    Jeff Briscoe M.D., PhD Professor of Urology    [facility] [employer] [facility] \[facility]\""/[facility]  for , Reproductive and Sexual Medicine    [facility]

## 2025-03-27 NOTE — LETTER BODY
[FreeTextEntry1] : Dear Dr. Mark Ayala,  Thank you for referring your patient Edward Robles for consultation for fertility concerns.  I have requested several baseline blood studies and a semen analysis. I will see the patient back in followup shortly and make further recommendations. I have attached a copy of my consultation note for your records.  Thank you again for this kind referral. I will certainly keep you updated with further progress. Please do not hesitate to call me if you have any questions.  Best regards,    Jeff Briscoe M.D., PhD Professor of Urology    [facility]  for , Reproductive and Sexual Medicine    [facility]

## 2025-04-17 NOTE — REASON FOR VISIT
[Home] : at home, [unfilled] , at the time of the visit. [Other Location: e.g. Home (Enter Location, City,State)___] : at [unfilled] [Telephone (audio)] : This telephonic visit was provided via audio only technology. [Patient preference] : patient preference. [Verbal consent obtained from patient] : the patient, [unfilled] [Follow - Up] : a follow-up visit [Hypothyroidism] : hypothyroidism

## 2025-04-17 NOTE — ASSESSMENT
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 Notes that he will need immunotherapy for 2 years total as per his oncology team. Pt has never previously been on thyroid medications.   1. Check Point Inhibitor induced Hypothyroidism.  TSH 0.17 -Continue Levothyroxine 125mcg QD  -Check point inhibitors are known to cause endocrinopathies including adrenal insufficiency, diabetes, hypophysitis/hypopituitarism. Repeat AM cortisol.  -Repeat TFTs in 1 month  2. Hypogonadism  Total Testosterone 287 Pt did not start clomid, pending PA  Patient verbalized understanding of the above. All questions were answered to patient's satisfaction. Dispo: Patient will follow up in 2 months TEB to review above results.

## 2025-04-24 NOTE — HISTORY OF PRESENT ILLNESS
[de-identified] : Mr. Robles is a 56 year old gentlemen with no significant past medical history presenting to the office for an initial consultation of melanoma.  Patient has been in the [facility] for 6 years. 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 Lived in most of [place] for over 20 years.  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 Has been an athlete for many decades. He is vegan. He swims a up to a couple miles daily.  He is open to complementary medicine, and meditation.   Patient noted development of a solitary hyperpigmented plaque over the right side of chest, which was gradually increasing in diameter with a slight change in color. Patient has history of significant sun exposure and was not fond of using sun protection. Punch Biopsy on 3/2019. Excisional biopsy taken of lesion 9/13/2019. Wide local excision 11/13/2020: Pathology: Consistent with melanoma. Axilla lymph node: 1/1 consistent with melanoma. Thickness, ulceration status and IHC are not available at this time.  CT chest 9/2019: No evidence of metastatic disease 12/23/2020 CT head, chest and abdomen performed in the [facility]: CT head negative for metastatic disease; CT chest demonstrates an increase in the size of the previously seen 8x5 mm nodule in the upper outer right anterior chest which currently measures 14x10 mm.; CT abdomen no evidence of metastatic disease. CT chest - RLL 6 mm and LLL 3 mm (compared with CT 9/2019).   2/19/21: I discussed PET scan and CT with radiologist. Lung lesions too small to confirm melanoma by CT biopsy. Thoracic surgeon would need to be consulted. I called Dr. Woodard, and he believes that the lesion in the right lung is accessible surgically. Also he thinks that there is a possibility of infection as opposed to melanoma. Patient is aware of this and is making an appointment. Patient slides obtained, and confirmed Stage 3b. We discussed that if the lungs are negative for melanoma, then would recommend 1 year adjuvant Opdivo as adjuvant.  3/12/2021 Patient here to review pathology, labs, films. He feels well and is able to play tennis  3/30/2021: 1) metastatic melanoma: Patient is here for C1 NIvolumab.He is feeling well physically and psychologically and ready to start treatment.  We re-reviewed logistics, mechanism of action and most common irAEs from Nivolumab. CT chest 3/30/2021: In comparison with 12/9/2021, status post interval right lower lobe wedge resection. 6 mm right lower lobe nodule (series 2 image 96) is enlarged; previously 2 mm. The remaining 8 mm and smaller lung nodules annotated on series 2 are stable. Difficult to delineate asymmetric soft tissue density lateral to the right pectoral muscle (series 2 image 38) is not significantly changed in size but contains a new small focus of hypodensity (series 601B image 74), possibly postprocedural seroma. 2) COVID-19 infection: Patient was tested positive for COVID-19 on 3/16.  He is asymptomatic and denies fever, chills, change in taste/smell, cough, SOB , diarrhea or fatigue. 3) Vaccine: Rah and Rah vaccine was given on 3/25/2021.   4/14/2021: 1) Metastatic melanoma: Patient is here for toxicity evaluation s/p one cycle of Nivolumab on 3/30/2021.  Patient did well and reports no significant irAEs.  Denies fever, chills, fatigue, arthralgia, myalgia, cough, rash, diarrhea, cough or chest pain. 2) Facial burning: Patient underwent Blue light procedure on 4/13/2021 with Dermatology.  He noticed "burning" on his face a couple of hour after his procedure which is causing some irritation.  He is scheduled to be evaluated by dermatology today.  5/11/2021: Metastatic melanoma: Patient is here for toxicity evaluation s/p two cycle of Nivolumab. Patient did well and reports no significant irAEs.  Denies fever, chills, fatigue, arthralgia, myalgia, cough, rash, diarrhea, cough or chest pain. COVID side effects have improved and completely resolved. No irAEs Has started swimming again [employer]image after C#4  5/25/2021: 1) melanoma: Patient is here for C3 Nivolumab.  He is doing well and reports no significant irAEs.   Denies fever, chills, fatigue, arthralgia, myalgia, cough, rash, diarrhea, cough or chest pain. Patient admits to grade one fatigue which he is able to push through.  No restrictions with his ADLs.  He is able to swim. We reviewed labs, no acute abnormality noted.  WIll recheck TSH levels today.  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 patient had covid and is feeling better but not at baseline - he is able to swim long distances but notes he gets fatigued at times when walks distances Due for CT scan next week.  9/14/2021: 1) Melanoma: Patient is here for C7 Nivolumab. Patient is doing well on treatment and voices no new complaints. Denies any significant irAEs. Denies fever, chills,  cough, SOB, arthralgia, myalgia, diarrhea. 2) checkpoint inhibitor induced dermatitis, : Has improved. Currently not using any ointments or cream.  Patient has been moisturizer. 3) Hypothyroid: Patient was evaluated by Endocrine, Dr. Martell who recommended Levothyroxine 125mcg Her thyroid functions are improving  4) Social: Patient is currently living at apartment close to [facility] house. He is currently not working.  11/9/2021: 1) Melanoma: Patient is here for C9 Nivolumab. Patient is doing well on treatment and voices no new complaints. Denies any significant irAEs today.  Denies fever, chills, cough, SOB, arthralgia, myalgia, diarrhea. 2) checkpoint inhibitor induced dermatitis: Has improved on phototherapy & topical steroids not currently using any ointments or cream.  Patient has been moisturizer. 3) Hypothyroid: Patient was evaluated by Endocrine, Dr. Martell who recommended Levothyroxine 137 mcg Her thyroid functions are improving. 4) Right upper quadrant pain: Patient admits to pain in the right lateral abdominal area x few months. Will have radiology review imaging from 10/2021. Appears to be musculoskeletal in nature.   1) Melanoma:  C12 Nivolumab. He is tolerating treatment well and reports no significant irAEs. Labs reviewed with patient on 1/4/2022, no acute abnormality. CT chest, abdomen/pelvis ordered. He has no restrictions with his ADLs. He is now ice skating. Diet and weight remain stable. He is being followed by Dr. 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  Currently on Levothyroxine 137 mcg.  3/1/2022 1)Melanoma C13 Nivolumab today. He is tolerating treatment extremely well and reports no significant irAEs. Patient underwent CT chest, abdomen/pelvis on 2/15/2022 which demonstrated the previously noted right lower lobe nodule no longer visualized. Otherwise, stable exam. He was evaluated by endocrine for hypothyroid, patient is now on Levothyroxine 150 mcg daily. He has no restrictions with his ADLs. Patient admits to grade one fatigue but is able to push through . He is able to swim ~ 2 miles/day.  Continues UV q 2 weeks, and will discuss stopping it. Notes dry mouth and taste.  3/29/2022: C14 Nivolumab today. He is tolerating treatment relatively well and reports no significant irAEs. He has no restrictions with his ADLs. Labs reviewed and LFT's are trending upwards. Grade one fatigue but is able to push through. He is currently on Levothyroxine 150 mcg daily and doing well. He is adjusting to the dose and will not start Cytomel at this time. Due to dry mouth Sjogren syndrome labs were ordered however not performed last visit, and will do so this time. He does note improved taste after a course of clotrimazole.  4/26/2022: C15 Nivolumab today. Labs reviewed, no acute abnormality. Thyroid functions remain WNL. Sjogren labs performed due to dry mouth, labs WNL. Tolerating treatment extremely well and reports on significant irAEs. Danny (Signatera) performed on 3/1/2022: Signatera Positive ; MTM/mL: 0.65  5/24/22 Patient will have C16 nivolumab. Repeat ct DNA next cycle. If positive, get PET. If negative, consider a few more treatments and stop or complete 2 year course. Thyroid issue seems to be addressed. Phototherapy is now once a month. Had stopped and then restarted due to increase in dermatitis. Mental state is positive. Remains underactive in terms of exercise. Has been supporting his father and family with medical issues.  7/19/2022 C18 Nivolumab today. Patient is tolerating treatment well and reports no significant irAEs. Patient is no longer receiving light therapy from dermatology. Patient is currently on Levothyroxine from 150 mcg to 137 mcg. Admits to grade one fatigue intermittently but has improved. He is now back to baseline. He continues to swim and has no restrictions with his ADLs.  8/16/22 C19 Nivolumab Doing well overall. Lisa has converted to negative on 7/19/22 Will continue CT scan q 4 months. Needs full TFTs today. Able to swim for long periods of time. Stopped phototherapy for  checkpoint inhibitor induced dermatitis. His only complaint is ear scaling.  9/15/2022: C20 Nivolumab on 9/13/2022 He is doing well on treatment and voices no major irAEs. Grade one fatigue but is able to push through. We reviewed his CT chest, abdomen/pelvis on 8/2022. Next CT 12/2022. Lisa on draw#2 negative on 7/29/2022 Next draw 10/2022.  10/07/2022 S/P C20 Nivolumab on 09/13/2022, and next cycle will be on 10/11/2022 He c/o pain to left side lower back. Also, he has some fatigue. His ear infection has cleared with use of medicated drops. CT-Scan of chest of 8/25/2022 showed slight increase of right chest wall nodule. Will get US and possible biopsy of nodule to r/o abnormal pathology.  11/08/2022: C22 Nivolumab today. US biopsy of chest wall negative on 10/2022. Patient is feeling well today and voices no major irAEs. Admits to grade one fatigue intermittently however is able to push through. Danny franklin D#3 today. Intermittent rash on his lower back and forehead.  He is using Triamcinolone on his lower back and Metronidazole on his forehead.  12/6/22 Patient here for C23 Will complete one more dose and then start surveillance with scans and ct-DNA q 3 months. Will obtain lab monitoring at home. He dose still have fatigue and thinks that it is related to low testosterone. Has not been sexually active.  01/03/2023 C24 Nivolumab today. Lisa Delgado D#3 on 11/2022 negative. D#4 02/2023 CT chest, abdomen/pelvis on 12/15/2022 which revealed no evidence of recurrent and/or metastatic disease. He is tolerating treatment relatively well and reports no major irAEs. Labs reviewed. Hormones are WNL.  01/31/2023: C25 Nivolumab today. Will treat for 26 cycles and stop. No major irAEs. D#4 Signatera on 01/03/2023 negative. He is doing well and voices no major complaints. Patient has gained weight while on treatment. Has not been exercising as often.  02/28/2023 C26 Nivolumab today (last treatment) No major irAEs. CT chest, abdomen/pelvis on 03/02/2023. He was seen by cardiology (Dr. Bingham) due to CORLEY. He underwent ECG and TTE which revealed no acute abnormality. His EF is 59%, normal LV systolic function. His exercise induced stress test did not reveal any acute abnormality. He was advised to undergo CT chest (calcium score) and will schedule.  07/07/2023 He is here for his follow up for metastatic melanoma. He was seen by Dr. Zaragoza (Ortho) for bilateral foot pain, R>L and swelling x 3 weeks. Patient has been doing PT and home stretch exercises which has been helping. His last signatera on 04/2023 was negative. Will repeat today. Last CT on 03/2023 stable. Next CT on 09/2023. Patient continues to have issues with his energy level. He is resting more and not as active however has no restrictions with his routine/instrumental ADLs. Patient has gained weight.  3/7/2024 Signatera Danny 2/19/2024 = 0.00 CT 2/21/2024 LUNGS AND LARGE AIRWAYS: Patent central airways. Stable submillimeter right upper lobe pulmonary nodule on image 27 of series 303. Left lower lobe tiny nodules bilaterally seen on the current exam. Patient is again noted to be post right lower lobe wedge resection PLEURA: No pleural effusion. VESSELS: Within normal limits. HEART: Heart size is normal. No pericardial effusion. MEDIASTINUM AND BRIDGER: No lymphadenopathy. CHEST WALL AND LOWER NECK: Scattered subcutaneous nodules similar in appearance to prior study. Representative nodule on image 29 of series 303 measures 7 mm and is unchanged. Right axillary scarring is also unchanged. Right sided mild gynecomastia is stable as well. LIVER: Within normal limits. BILE DUCTS: Normal caliber. GALLBLADDER: Within normal limits. SPLEEN: Unremarkable PANCREAS: Within normal limits. ADRENALS: Within normal limits. KIDNEYS/URETERS: Within normal limits. BLADDER: Minimally distended. REPRODUCTIVE ORGANS: Prostate within normal limits. BOWEL: No bowel obstruction. Appendix is normal. A small hiatal hernia is seen with mild colonic diverticulosis without diverticulitis PERITONEUM: No ascites VESSELS: Within normal limits. RETROPERITONEUM/LYMPH NODES: No lymphadenopathy. Prominent bilateral inguinal lymph nodes are unchanged. ABDOMINAL WALL: 2 small fat-containing inguinal hernias. Small midline fat-containing ventral hernia. BONES: Degenerative changes. IMPRESSION: Stable examination.  Feeling well and voices no new complaints. Addressed all questions. Looking to lose some weight. Plans to go to the [facility]. He is looking to take care of his parents more here, and work with [facility] remotely.  7/1/24 Last Signatera Danny on 6/12 is not detected. Will repeat in October 2024. Feeling well and voices no major complaints. 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  10/3/2024 Patient with h/o melanoma just in lungs. He was on treatment for 2 years. Had imaging done at [facility]. I had it loaded. Will ask radiology to review the PET - there is a liver and spine T7 lesion. Had rise of Signatera.  10/23/24 He is for C1 Ipilimumab (3) + Nivolumab (1) Was evaluated by Dr. Jeff Briscoe (Urology) for sperm banking. Patient wanted to discuss his labs prior to receiving therapy. He was educated no contraindications to receive therapy with positive Hb AB. He was positive in the past (2/2021) and had negative antigens. He was told his syphillis titers were positive. Unable to located in his chart. Requested labs be sent over to our team. He will need to be seen one week prior to C2 for toxicity evaluation and labs.  11/6/24 Patient is here for toxicity evaluation s/p 1 cycle of Ipilimumab (3) + Nivolumab (1). Did well and voices no major irAEs. Mild headache one day which has subsided Labs today. 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 Mild fatigue but is able to push through.  11/25/24 S/P 2 cycles of Ipi 3 + Nivo 1 on 11/13. C3 is scheduled for 12/4. Patient completed SRS to T spine (T7) in 1 fraction on 11/19/24. Labs today. Doing well, no major irAEs. Admits to soreness in the T7 region after radiation. No major pain. Admits to dry mouth and was given Clotrimazole with relief.  12/19/24 Patient is here s/p 3 cycles of Ipi 3 and Nivo 1. C4 is scheduled for 12/26/24. Patient endorses headache in the back of his head and frontal lobe. It has been more consistent over the last 5-7 days. No triggering factors a/w pain. No trauma to area. Pain is very mild and abates without acute intervention. Does not wake him up in the middle of the night. No change in mental status. No history of migraines in the past.  1/29/25 He is s/p 1 cycle of Nivolumab 480 mg/dose on 1/23/25 He is s/p mechanical fall a few weeks and was seen by urgent care. Patient recalled the incident and did NOT his head. He landed on his right lower back/buttock area + right side of his body. He was able to get up and perform his routine/instrumental ADLs afterwards. Three days later patient was having severe pain in the right rib and went to urgent care. XRAYs were negative. He was given pain medication/muscle relaxers. PET/CT scheduled for 2/3/25  02/20/25 Reports occasional back soreness which is improving Has MRI cervical spine in a few days  Had thrush but thinks it has improved; it numbed his mouth so he stopped taking; currently has dry lips/mouth, inside of mouth feels burned like after eating something hot, taste feels different Skin feels very good, no rashes During last treatment had some prickling/small red dots in chest but resolved No headaches for over a month Occasional fatigue C2 nivolumab today Losing some weight intentionally from being busier; eating vegan and less processed foods, drinking more infused water  4/24/2025 C8 Nivolumab at 480 mg/dose. Signatera on 4/25 - not detected Doing well. Dry mouth has improved. Taste is returning. He is going to [facility]. Grade one fatigue but is able to push through. 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 Completed SBRT to the T spine cGy in 1 fraction 11/19/2024. Repeat MR T spine in mid-June 2025.  [de-identified] : Surgical Oncology: Santos Seals\par  Thoracic Surgeon: Gatito Woodard \par  \par  Pt's sister Yohana Baca is best contact:  [phone]\par  \par  Patient sister home: [phone]\par  Patient new cell: [phone]\par  Back up number - [phone]

## 2025-04-24 NOTE — BEGINNING OF VISIT
[0] : 2) Feeling down, depressed, or hopeless: Not at all (0) [PHQ-2 Negative] : PHQ-2 Negative [Never] : Never [Date Discussed (MM/DD/YY): ___] : Discussed: [unfilled] [With Patient/Caregiver] : with Patient/Caregiver

## 2025-04-24 NOTE — HISTORY OF PRESENT ILLNESS
[de-identified] : Mr. Robles is a 56 year old gentlemen with no significant past medical history presenting to the office for an initial consultation of melanoma.  Patient has been in the [facility] for 6 years. 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 Lived in most of [place] for over 20 years.  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 Has been an athlete for many decades. He is vegan. He swims a up to a couple miles daily.  He is open to complementary medicine, and meditation.   Patient noted development of a solitary hyperpigmented plaque over the right side of chest, which was gradually increasing in diameter with a slight change in color. Patient has history of significant sun exposure and was not fond of using sun protection. Punch Biopsy on 3/2019. Excisional biopsy taken of lesion 9/13/2019. Wide local excision 11/13/2020: Pathology: Consistent with melanoma. Axilla lymph node: 1/1 consistent with melanoma. Thickness, ulceration status and IHC are not available at this time.  CT chest 9/2019: No evidence of metastatic disease 12/23/2020 CT head, chest and abdomen performed in the [facility]: CT head negative for metastatic disease; CT chest demonstrates an increase in the size of the previously seen 8x5 mm nodule in the upper outer right anterior chest which currently measures 14x10 mm.; CT abdomen no evidence of metastatic disease. CT chest - RLL 6 mm and LLL 3 mm (compared with CT 9/2019).   2/19/21: I discussed PET scan and CT with radiologist. Lung lesions too small to confirm melanoma by CT biopsy. Thoracic surgeon would need to be consulted. I called Dr. Woodard, and he believes that the lesion in the right lung is accessible surgically. Also he thinks that there is a possibility of infection as opposed to melanoma. Patient is aware of this and is making an appointment. Patient slides obtained, and confirmed Stage 3b. We discussed that if the lungs are negative for melanoma, then would recommend 1 year adjuvant Opdivo as adjuvant.  3/12/2021 Patient here to review pathology, labs, films. He feels well and is able to play tennis  3/30/2021: 1) metastatic melanoma: Patient is here for C1 NIvolumab.He is feeling well physically and psychologically and ready to start treatment.  We re-reviewed logistics, mechanism of action and most common irAEs from Nivolumab. CT chest 3/30/2021: In comparison with 12/9/2021, status post interval right lower lobe wedge resection. 6 mm right lower lobe nodule (series 2 image 96) is enlarged; previously 2 mm. The remaining 8 mm and smaller lung nodules annotated on series 2 are stable. Difficult to delineate asymmetric soft tissue density lateral to the right pectoral muscle (series 2 image 38) is not significantly changed in size but contains a new small focus of hypodensity (series 601B image 74), possibly postprocedural seroma. 2) COVID-19 infection: Patient was tested positive for COVID-19 on 3/16.  He is asymptomatic and denies fever, chills, change in taste/smell, cough, SOB , diarrhea or fatigue. 3) Vaccine: Rah and Rah vaccine was given on 3/25/2021.   4/14/2021: 1) Metastatic melanoma: Patient is here for toxicity evaluation s/p one cycle of Nivolumab on 3/30/2021.  Patient did well and reports no significant irAEs.  Denies fever, chills, fatigue, arthralgia, myalgia, cough, rash, diarrhea, cough or chest pain. 2) Facial burning: Patient underwent Blue light procedure on 4/13/2021 with Dermatology.  He noticed "burning" on his face a couple of hour after his procedure which is causing some irritation.  He is scheduled to be evaluated by dermatology today.  5/11/2021: Metastatic melanoma: Patient is here for toxicity evaluation s/p two cycle of Nivolumab. Patient did well and reports no significant irAEs.  Denies fever, chills, fatigue, arthralgia, myalgia, cough, rash, diarrhea, cough or chest pain. COVID side effects have improved and completely resolved. No irAEs Has started swimming again [employer]image after C#4  5/25/2021: 1) melanoma: Patient is here for C3 Nivolumab.  He is doing well and reports no significant irAEs.   Denies fever, chills, fatigue, arthralgia, myalgia, cough, rash, diarrhea, cough or chest pain. Patient admits to grade one fatigue which he is able to push through.  No restrictions with his ADLs.  He is able to swim. We reviewed labs, no acute abnormality noted.  WIll recheck TSH levels today.  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 patient had covid and is feeling better but not at baseline - he is able to swim long distances but notes he gets fatigued at times when walks distances Due for CT scan next week.  9/14/2021: 1) Melanoma: Patient is here for C7 Nivolumab. Patient is doing well on treatment and voices no new complaints. Denies any significant irAEs. Denies fever, chills,  cough, SOB, arthralgia, myalgia, diarrhea. 2) checkpoint inhibitor induced dermatitis, : Has improved. Currently not using any ointments or cream.  Patient has been moisturizer. 3) Hypothyroid: Patient was evaluated by Endocrine, Dr. Martell who recommended Levothyroxine 125mcg Her thyroid functions are improving  4) Social: Patient is currently living at apartment close to [facility] house. He is currently not working.  11/9/2021: 1) Melanoma: Patient is here for C9 Nivolumab. Patient is doing well on treatment and voices no new complaints. Denies any significant irAEs today.  Denies fever, chills, cough, SOB, arthralgia, myalgia, diarrhea. 2) checkpoint inhibitor induced dermatitis: Has improved on phototherapy & topical steroids not currently using any ointments or cream.  Patient has been moisturizer. 3) Hypothyroid: Patient was evaluated by Endocrine, Dr. Martell who recommended Levothyroxine 137 mcg Her thyroid functions are improving. 4) Right upper quadrant pain: Patient admits to pain in the right lateral abdominal area x few months. Will have radiology review imaging from 10/2021. Appears to be musculoskeletal in nature.   1) Melanoma:  C12 Nivolumab. He is tolerating treatment well and reports no significant irAEs. Labs reviewed with patient on 1/4/2022, no acute abnormality. CT chest, abdomen/pelvis ordered. He has no restrictions with his ADLs. He is now ice skating. Diet and weight remain stable. He is being followed by Dr. 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  Currently on Levothyroxine 137 mcg.  3/1/2022 1)Melanoma C13 Nivolumab today. He is tolerating treatment extremely well and reports no significant irAEs. Patient underwent CT chest, abdomen/pelvis on 2/15/2022 which demonstrated the previously noted right lower lobe nodule no longer visualized. Otherwise, stable exam. He was evaluated by endocrine for hypothyroid, patient is now on Levothyroxine 150 mcg daily. He has no restrictions with his ADLs. Patient admits to grade one fatigue but is able to push through . He is able to swim ~ 2 miles/day.  Continues UV q 2 weeks, and will discuss stopping it. Notes dry mouth and taste.  3/29/2022: C14 Nivolumab today. He is tolerating treatment relatively well and reports no significant irAEs. He has no restrictions with his ADLs. Labs reviewed and LFT's are trending upwards. Grade one fatigue but is able to push through. He is currently on Levothyroxine 150 mcg daily and doing well. He is adjusting to the dose and will not start Cytomel at this time. Due to dry mouth Sjogren syndrome labs were ordered however not performed last visit, and will do so this time. He does note improved taste after a course of clotrimazole.  4/26/2022: C15 Nivolumab today. Labs reviewed, no acute abnormality. Thyroid functions remain WNL. Sjogren labs performed due to dry mouth, labs WNL. Tolerating treatment extremely well and reports on significant irAEs. Danny (Signatera) performed on 3/1/2022: Signatera Positive ; MTM/mL: 0.65  5/24/22 Patient will have C16 nivolumab. Repeat ct DNA next cycle. If positive, get PET. If negative, consider a few more treatments and stop or complete 2 year course. Thyroid issue seems to be addressed. Phototherapy is now once a month. Had stopped and then restarted due to increase in dermatitis. Mental state is positive. Remains underactive in terms of exercise. Has been supporting his father and family with medical issues.  7/19/2022 C18 Nivolumab today. Patient is tolerating treatment well and reports no significant irAEs. Patient is no longer receiving light therapy from dermatology. Patient is currently on Levothyroxine from 150 mcg to 137 mcg. Admits to grade one fatigue intermittently but has improved. He is now back to baseline. He continues to swim and has no restrictions with his ADLs.  8/16/22 C19 Nivolumab Doing well overall. Lisa has converted to negative on 7/19/22 Will continue CT scan q 4 months. Needs full TFTs today. Able to swim for long periods of time. Stopped phototherapy for  checkpoint inhibitor induced dermatitis. His only complaint is ear scaling.  9/15/2022: C20 Nivolumab on 9/13/2022 He is doing well on treatment and voices no major irAEs. Grade one fatigue but is able to push through. We reviewed his CT chest, abdomen/pelvis on 8/2022. Next CT 12/2022. Lisa on draw#2 negative on 7/29/2022 Next draw 10/2022.  10/07/2022 S/P C20 Nivolumab on 09/13/2022, and next cycle will be on 10/11/2022 He c/o pain to left side lower back. Also, he has some fatigue. His ear infection has cleared with use of medicated drops. CT-Scan of chest of 8/25/2022 showed slight increase of right chest wall nodule. Will get US and possible biopsy of nodule to r/o abnormal pathology.  11/08/2022: C22 Nivolumab today. US biopsy of chest wall negative on 10/2022. Patient is feeling well today and voices no major irAEs. Admits to grade one fatigue intermittently however is able to push through. Danny franklin D#3 today. Intermittent rash on his lower back and forehead.  He is using Triamcinolone on his lower back and Metronidazole on his forehead.  12/6/22 Patient here for C23 Will complete one more dose and then start surveillance with scans and ct-DNA q 3 months. Will obtain lab monitoring at home. He dose still have fatigue and thinks that it is related to low testosterone. Has not been sexually active.  01/03/2023 C24 Nivolumab today. Lisa Delgado D#3 on 11/2022 negative. D#4 02/2023 CT chest, abdomen/pelvis on 12/15/2022 which revealed no evidence of recurrent and/or metastatic disease. He is tolerating treatment relatively well and reports no major irAEs. Labs reviewed. Hormones are WNL.  01/31/2023: C25 Nivolumab today. Will treat for 26 cycles and stop. No major irAEs. D#4 Signatera on 01/03/2023 negative. He is doing well and voices no major complaints. Patient has gained weight while on treatment. Has not been exercising as often.  02/28/2023 C26 Nivolumab today (last treatment) No major irAEs. CT chest, abdomen/pelvis on 03/02/2023. He was seen by cardiology (Dr. Bingham) due to CORLEY. He underwent ECG and TTE which revealed no acute abnormality. His EF is 59%, normal LV systolic function. His exercise induced stress test did not reveal any acute abnormality. He was advised to undergo CT chest (calcium score) and will schedule.  07/07/2023 He is here for his follow up for metastatic melanoma. He was seen by Dr. Zaragoza (Ortho) for bilateral foot pain, R>L and swelling x 3 weeks. Patient has been doing PT and home stretch exercises which has been helping. His last signatera on 04/2023 was negative. Will repeat today. Last CT on 03/2023 stable. Next CT on 09/2023. Patient continues to have issues with his energy level. He is resting more and not as active however has no restrictions with his routine/instrumental ADLs. Patient has gained weight.  3/7/2024 Signatera Danny 2/19/2024 = 0.00 CT 2/21/2024 LUNGS AND LARGE AIRWAYS: Patent central airways. Stable submillimeter right upper lobe pulmonary nodule on image 27 of series 303. Left lower lobe tiny nodules bilaterally seen on the current exam. Patient is again noted to be post right lower lobe wedge resection PLEURA: No pleural effusion. VESSELS: Within normal limits. HEART: Heart size is normal. No pericardial effusion. MEDIASTINUM AND BRIDGER: No lymphadenopathy. CHEST WALL AND LOWER NECK: Scattered subcutaneous nodules similar in appearance to prior study. Representative nodule on image 29 of series 303 measures 7 mm and is unchanged. Right axillary scarring is also unchanged. Right sided mild gynecomastia is stable as well. LIVER: Within normal limits. BILE DUCTS: Normal caliber. GALLBLADDER: Within normal limits. SPLEEN: Unremarkable PANCREAS: Within normal limits. ADRENALS: Within normal limits. KIDNEYS/URETERS: Within normal limits. BLADDER: Minimally distended. REPRODUCTIVE ORGANS: Prostate within normal limits. BOWEL: No bowel obstruction. Appendix is normal. A small hiatal hernia is seen with mild colonic diverticulosis without diverticulitis PERITONEUM: No ascites VESSELS: Within normal limits. RETROPERITONEUM/LYMPH NODES: No lymphadenopathy. Prominent bilateral inguinal lymph nodes are unchanged. ABDOMINAL WALL: 2 small fat-containing inguinal hernias. Small midline fat-containing ventral hernia. BONES: Degenerative changes. IMPRESSION: Stable examination.  Feeling well and voices no new complaints. Addressed all questions. Looking to lose some weight. Plans to go to the [facility]. He is looking to take care of his parents more here, and work with [facility] remotely.  7/1/24 Last Signatera Danny on 6/12 is not detected. Will repeat in October 2024. Feeling well and voices no major complaints. 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  10/3/2024 Patient with h/o melanoma just in lungs. He was on treatment for 2 years. Had imaging done at [facility]. I had it loaded. Will ask radiology to review the PET - there is a liver and spine T7 lesion. Had rise of Signatera.  10/23/24 He is for C1 Ipilimumab (3) + Nivolumab (1) Was evaluated by Dr. Jeff Briscoe (Urology) for sperm banking. Patient wanted to discuss his labs prior to receiving therapy. He was educated no contraindications to receive therapy with positive Hb AB. He was positive in the past (2/2021) and had negative antigens. He was told his syphillis titers were positive. Unable to located in his chart. Requested labs be sent over to our team. He will need to be seen one week prior to C2 for toxicity evaluation and labs.  11/6/24 Patient is here for toxicity evaluation s/p 1 cycle of Ipilimumab (3) + Nivolumab (1). Did well and voices no major irAEs. Mild headache one day which has subsided Labs today. 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 Mild fatigue but is able to push through.  11/25/24 S/P 2 cycles of Ipi 3 + Nivo 1 on 11/13. C3 is scheduled for 12/4. Patient completed SRS to T spine (T7) in 1 fraction on 11/19/24. Labs today. Doing well, no major irAEs. Admits to soreness in the T7 region after radiation. No major pain. Admits to dry mouth and was given Clotrimazole with relief.  12/19/24 Patient is here s/p 3 cycles of Ipi 3 and Nivo 1. C4 is scheduled for 12/26/24. Patient endorses headache in the back of his head and frontal lobe. It has been more consistent over the last 5-7 days. No triggering factors a/w pain. No trauma to area. Pain is very mild and abates without acute intervention. Does not wake him up in the middle of the night. No change in mental status. No history of migraines in the past.  1/29/25 He is s/p 1 cycle of Nivolumab 480 mg/dose on 1/23/25 He is s/p mechanical fall a few weeks and was seen by urgent care. Patient recalled the incident and did NOT his head. He landed on his right lower back/buttock area + right side of his body. He was able to get up and perform his routine/instrumental ADLs afterwards. Three days later patient was having severe pain in the right rib and went to urgent care. XRAYs were negative. He was given pain medication/muscle relaxers. PET/CT scheduled for 2/3/25  02/20/25 Reports occasional back soreness which is improving Has MRI cervical spine in a few days  Had thrush but thinks it has improved; it numbed his mouth so he stopped taking; currently has dry lips/mouth, inside of mouth feels burned like after eating something hot, taste feels different Skin feels very good, no rashes During last treatment had some prickling/small red dots in chest but resolved No headaches for over a month Occasional fatigue C2 nivolumab today Losing some weight intentionally from being busier; eating vegan and less processed foods, drinking more infused water  4/24/2025 C8 Nivolumab at 480 mg/dose. Signatera on 4/25 - not detected Doing well. Dry mouth has improved. Taste is returning. He is going to [facility]. Grade one fatigue but is able to push through. 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 Completed SBRT to the T spine cGy in 1 fraction 11/19/2024. Repeat MR T spine in mid-June 2025.  [de-identified] : Surgical Oncology: Santos Seals\par  Thoracic Surgeon: Gatito Woodard \par  \par  Pt's sister Yohana Baca is best contact:  [phone]\par  \par  Patient sister home: [phone]\par  Patient new cell: [phone]\par  Back up number - [phone]

## 2025-04-24 NOTE — ASSESSMENT
[FreeTextEntry1] : Patient has melanoma on right chest resected 11/2020. A positive right axillary node also found. On CT scan 12/2020 there are 2 subcentimeter nodules that are reported to be new since 9/2019. Patient has returned from [facility] for further evaluation and treatment. This is considered Stage 3b. PET and CT are reviewed today. The CT shows nodules, and patient underwent biopsy with Dr Woodard.   Nivolumab  C1 3/30/2021. C26 02/28/2023 - last treatment.  Danny (Signatera) performed on 3/1/2022: Signatera Positive had converted to negative on 7/19/2022.  Then it turned positive again August 29, 2024 ctDNA = 10.11  Patient with h/o melanoma just in lungs. He was on treatment for 2 years. Had imaging done at [facility]. I had it loaded. Will ask radiology to review the PET - there is a liver and spine T7 lesion. Had rise of Signatera.  IPI 3 + NIVO 1 C1D1 10/23 C2 11/13 C3 12/4 C4 12/26 C1 maintenance Nivolumab 1/23/25 He is s/p 1 cycle of Nivolumab 480 mg/dose on 1/23/25 C2 maintenance nivolumab 2/20/25 C7 3/20/25 C8 4/24/25   Signatera  Apr 11, 2025 0.00 Feb 20, 2025 0.00 Feb 12, 2025 0.00 Dec 26, 2024 45.47 Nov 25, 2024 43.55 Aug 29, 2024 10.11 Jun 12, 2024 0.00 Feb 19, 2024 0.00  Reviewed recent PET/CT scan with Mr. Robles, showing there is response, and therefore we will continue maintenance nivolumab.  2/2025 PET/CT showed very good response.  Was seen by Dr. Sanchez due to his RT to T7 lesion. Completed SBRT to the T spine cgy in 1 fraction 11/19/2024. Repeat MR T spine in mid-June 2025.  Hypothyroid:  Lowered Levothyroxine 137 mcg to 100 mcg daily.  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 Jeff Paez US testes negative.  Will repeat CT in 3-4 months. Continue to monitor Signatera  time = 35 min

## 2025-04-24 NOTE — PHYSICAL EXAM
[Normal] : affect appropriate [Restricted in physically strenuous activity but ambulatory and able to carry out work of a light or sedentary nature] : Status 1- Restricted in physically strenuous activity but ambulatory and able to carry out work of a light or sedentary nature, e.g., light house work, office work [de-identified] : dry MM [de-identified] : breathing comfortably on room air

## 2025-04-24 NOTE — PHYSICAL EXAM
[Normal] : affect appropriate [Restricted in physically strenuous activity but ambulatory and able to carry out work of a light or sedentary nature] : Status 1- Restricted in physically strenuous activity but ambulatory and able to carry out work of a light or sedentary nature, e.g., light house work, office work [de-identified] : dry MM [de-identified] : breathing comfortably on room air

## 2025-04-24 NOTE — ASSESSMENT
[FreeTextEntry1] : Patient has melanoma on right chest resected 11/2020. A positive right axillary node also found. On CT scan 12/2020 there are 2 subcentimeter nodules that are reported to be new since 9/2019. Patient has returned from [facility] for further evaluation and treatment. This is considered Stage 3b. PET and CT are reviewed today. The CT shows nodules, and patient underwent biopsy with Dr Woodard.   Nivolumab  C1 3/30/2021. C26 02/28/2023 - last treatment.  Danny (Signatera) performed on 3/1/2022: Signatera Positive had converted to negative on 7/19/2022.  Then it turned positive again August 29, 2024 ctDNA = 10.11  Patient with h/o melanoma just in lungs. He was on treatment for 2 years. Had imaging done at [facility]. I had it loaded. Will ask radiology to review the PET - there is a liver and spine T7 lesion. Had rise of Signatera.  IPI 3 + NIVO 1 C1D1 10/23 C2 11/13 C3 12/4 C4 12/26 C1 maintenance Nivolumab 1/23/25 He is s/p 1 cycle of Nivolumab 480 mg/dose on 1/23/25 C2 maintenance nivolumab 2/20/25 C7 3/20/25 C8 4/24/25   Signatera  Apr 11, 2025 0.00 Feb 20, 2025 0.00 Feb 12, 2025 0.00 Dec 26, 2024 45.47 Nov 25, 2024 43.55 Aug 29, 2024 10.11 Jun 12, 2024 0.00 Feb 19, 2024 0.00  Reviewed recent PET/CT scan with Mr. Robles, showing there is response, and therefore we will continue maintenance nivolumab.  2/2025 PET/CT showed very good response.  Was seen by Dr. Sanchez due to his RT to T7 lesion. Completed SBRT to the T spine cgy in 1 fraction 11/19/2024. Repeat MR T spine in mid-June 2025.  Hypothyroid:  Lowered Levothyroxine 137 mcg to 100 mcg daily.  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 Jeff Paez US testes negative.  Will repeat CT in 3-4 months. Continue to monitor Signatera  time = 35 min

## 2025-05-05 NOTE — HISTORY OF PRESENT ILLNESS
[FreeTextEntry1] : Patient saw  for low testosterone. History of metastatic melanoma, has received immuno therapy in 2021, in remission until recently, restart immuno therapy recently. PET scan done showing hypermetabolism within both testes. PSA 2.5 in 2025   Elevated PSA velocity..  Patient has hypogonadism.  Plan MRI.  Fusion biopsy.   Please refer to U[facility] Consult Note.

## 2025-05-05 NOTE — LETTER BODY
[FreeTextEntry1] : Reyna Vladimir,  [address] [place] [address] Phone: [phone]  Dear Dr. Gilbert,        Reason for Visit: Elevated PSA velocity. Hypogonadism.       This is a 60 year-old gentleman with elevated PSA velocity and hypogonadism.  Patient has history of metastatic melanoma.  Patient previously saw Dr. Briscoe. Patient with a history of metastatic melanoma status post immunotherapy in 2021. He is in remission until recently. He is currently restarting immunotherapy. His PET scan demonstrated hypermetabolism within both testes. He is here today for follow-up. Since he was last seen, patient's PSA is 2.5. He has elevated PSA velocity. Patient reports that he has not had previous prostate biopsy.  Patient denies any hematuria or lower urinary tract symptoms. He denies any pain. The patient denies any aggravating or relieving factors. The patient denies any interference of function. The patient is entirely asymptomatic. All other review of systems are negative. He has no cancer in his family medical history. He has no previous surgical history. Past medical history, family history and social history were inquired and were noncontributory to current condition. The patient does not use tobacco or drink alcohol. Medications and allergies were reviewed. He has no known allergies to medication.       On examination, the patient is a healthy-appearing gentleman in no acute distress. He is alert and oriented follows commands. He has normal mood and affect. He is normocephalic. Neck is supple. Oral no thrush. Respirations are unlabored. His abdomen is soft and nontender. Bladder is nonpalpable. No CVA tenderness. Neurologically he is grossly intact. No peripheral edema. Skin without gross abnormality.    Patient has elevated PSA velocity.   Assessment: Elevated PSA. Hypogonadism.       I counseled the patient. I discussed with him the risk of occult malignancy. I recommended he obtain a prostate MRI for fusion targeted prostate biopsy. Risks and alternatives were discussed. I answered the patient questions. He will take the necessary preparations for the procedure, including fleet enema. I also recommended he repeat PSA to ensure stability. The patient will follow-up as directed and will contact me with any questions or concerns. Thank you for the opportunity to participate in the care of Mr. WADE. I will keep you updated on his progress.       Plan: PSA. BMP. Prostate MRI. Consider fusion targeted prostate biopsy. Follow-up as directed.

## 2025-05-05 NOTE — ADDENDUM
[FreeTextEntry1] : Entered by Danilo Yan, acting as scribe for Dr. Edward Ignacio. The documentation recorded by the scribe accurately reflects the service I personally performed and the decisions made by me.

## 2025-05-15 NOTE — ASSESSMENT
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 - We have discussed treatment options, expectations from treatment, and associated side effects of topical therapies. - keto shampoo - Switch from lidex foam to HCT 2.5% lotion per Pt request to scalp BID x 2 wks on, 1 wk off. SED.   # History of melanoma right chest, stage 3b (+LN and lung nodules), s/p WLE and on nivolumab 3/2021-2/2023 - NOW on nivolumab again after October 2024 PET revealing liver and spine T7 lesion, started ipi and nivo, completed ipi, now on maintenance nivo, now in remission.   #benign nevi #lentigines #SKs Discussed nature and course Reviewed benign features Suggest monitoring for changes, patient in agreement  Screening for skin cancer -ABCDEs of melanoma, sun safety, and self-skin check reviewed -Counseled pt to notify us of any changing or concerning lesions - Advised sun protection, SPF 30 or higher daily and reapply often, sun protective clothing - TBSE performed today, with 0 lesions concerning for malignancy - Next TBSE due 3 months.   >45 min spent

## 2025-05-15 NOTE — HISTORY OF PRESENT ILLNESS
[FreeTextEntry1] : f/u tbse, hx melanoma [de-identified] : 60M last seen July 2024, with history of invasive melanoma (stage IIIB), on nivolumab 03/2021-02/2023, here for: TBSE. NO lesions concern.  Had rise of Signatera in October 2024 with PET revealing liver and spine T7 lesion, started ipi and nivo, completed ipi, now on maintenance nivo, now in remission.   HX of checkpoint inhibitor induced dermatitis s/p nbUVB, now resolved

## 2025-05-15 NOTE — PHYSICAL EXAM
[Full Body Skin Exam Performed] : performed [FreeTextEntry3] : A complete skin examination was performed of the scalp/hair, head/face, conjunctivae/lids, lips/teeth/gums, neck, digits/nails, right and left axilla, right and left upper and lower extremities, chest, abdomen, back, buttocks and groin area. No bromohidrosis. No hyperhidrosis.   Notable findings are documented below:  - surgical scar on right chest w/o nodularity or pigmentation - Pink, erythematous patches with thicker yellowish scale on the nose and nasolabial folds - depigmented patch at the lower back - Fairly uniform and regular brown macules and papules on the trunk and extremities - Dark purple papule inferior cutaneous lip

## 2025-05-22 NOTE — HISTORY OF PRESENT ILLNESS
[de-identified] : Mr. Robles is a 56 year old gentlemen with no significant past medical history presenting to the office for an initial consultation of melanoma.  Patient has been in the [facility] for 6 years. Prior to that he lived in [place] for investment reports for [employer]. Lived in most of [place] for over 20 years.  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 Has been an athlete for many decades. He is vegan. He swims a up to a couple miles daily.  He is open to complementary medicine, and meditation.   Patient noted development of a solitary hyperpigmented plaque over the right side of chest, which was gradually increasing in diameter with a slight change in color. Patient has history of significant sun exposure and was not fond of using sun protection. Punch Biopsy on 3/2019. Excisional biopsy taken of lesion 9/13/2019. Wide local excision 11/13/2020: Pathology: Consistent with melanoma. Axilla lymph node: 1/1 consistent with melanoma. Thickness, ulceration status and IHC are not available at this time.  CT chest 9/2019: No evidence of metastatic disease 12/23/2020 CT head, chest and abdomen performed in the [facility]: CT head negative for metastatic disease; CT chest demonstrates an increase in the size of the previously seen 8x5 mm nodule in the upper outer right anterior chest which currently measures 14x10 mm.; CT abdomen no evidence of metastatic disease. CT chest - RLL 6 mm and LLL 3 mm (compared with CT 9/2019).   2/19/21: I discussed PET scan and CT with radiologist. Lung lesions too small to confirm melanoma by CT biopsy. Thoracic surgeon would need to be consulted. I called Dr. Woodard, and he believes that the lesion in the right lung is accessible surgically. Also he thinks that there is a possibility of infection as opposed to melanoma. Patient is aware of this and is making an appointment. Patient slides obtained, and confirmed Stage 3b. We discussed that if the lungs are negative for melanoma, then would recommend 1 year adjuvant Opdivo as adjuvant.  3/12/2021 Patient here to review pathology, labs, films. He feels well and is able to play tennis  3/30/2021: 1) metastatic melanoma: Patient is here for C1 NIvolumab.He is feeling well physically and psychologically and ready to start treatment.  We re-reviewed logistics, mechanism of action and most common irAEs from Nivolumab. CT chest 3/30/2021: In comparison with 12/9/2021, status post interval right lower lobe wedge resection. 6 mm right lower lobe nodule (series 2 image 96) is enlarged; previously 2 mm. The remaining 8 mm and smaller lung nodules annotated on series 2 are stable. Difficult to delineate asymmetric soft tissue density lateral to the right pectoral muscle (series 2 image 38) is not significantly changed in size but contains a new small focus of hypodensity (series 601B image 74), possibly postprocedural seroma. 2) COVID-19 infection: Patient was tested positive for COVID-19 on 3/16.  He is asymptomatic and denies fever, chills, change in taste/smell, cough, SOB , diarrhea or fatigue. 3) Vaccine: Rah and Rah vaccine was given on 3/25/2021.   4/14/2021: 1) Metastatic melanoma: Patient is here for toxicity evaluation s/p one cycle of Nivolumab on 3/30/2021.  Patient did well and reports no significant irAEs.  Denies fever, chills, fatigue, arthralgia, myalgia, cough, rash, diarrhea, cough or chest pain. 2) Facial burning: Patient underwent Blue light procedure on 4/13/2021 with Dermatology.  He noticed "burning" on his face a couple of hour after his procedure which is causing some irritation.  He is scheduled to be evaluated by dermatology today.  5/11/2021: Metastatic melanoma: Patient is here for toxicity evaluation s/p two cycle of Nivolumab. Patient did well and reports no significant irAEs.  Denies fever, chills, fatigue, arthralgia, myalgia, cough, rash, diarrhea, cough or chest pain. COVID side effects have improved and completely resolved. No irAEs Has started swimming again Reimage after C#4  5/25/2021: 1) melanoma: Patient is here for C3 Nivolumab.  He is doing well and reports no significant irAEs.   Denies fever, chills, fatigue, arthralgia, myalgia, cough, rash, diarrhea, cough or chest pain. Patient admits to grade one fatigue which he is able to push through.  No restrictions with his ADLs.  He is able to swim. We reviewed labs, no acute abnormality noted.  WIll recheck TSH levels today.  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 patient had covid and is feeling better but not at baseline - he is able to swim long distances but notes he gets fatigued at times when walks distances Due for CT scan next week.  9/14/2021: 1) Melanoma: Patient is here for C7 Nivolumab. Patient is doing well on treatment and voices no new complaints. Denies any significant irAEs. Denies fever, chills,  cough, SOB, arthralgia, myalgia, diarrhea. 2) checkpoint inhibitor induced dermatitis, : Has improved. Currently not using any ointments or cream.  Patient has been moisturizer. 3) Hypothyroid: Patient was evaluated by Endocrine, Dr. Martell who recommended Levothyroxine 125mcg Her thyroid functions are improving  4) Social: Patient is currently living at apartment close to [facility] house. He is currently not working.  11/9/2021: 1) Melanoma: Patient is here for C9 Nivolumab. Patient is doing well on treatment and voices no new complaints. Denies any significant irAEs today.  Denies fever, chills, cough, SOB, arthralgia, myalgia, diarrhea. 2) checkpoint inhibitor induced dermatitis: Has improved on phototherapy & topical steroids not currently using any ointments or cream.  Patient has been moisturizer. 3) Hypothyroid: Patient was evaluated by Endocrine, Dr. Martell who recommended Levothyroxine 137 mcg Her thyroid functions are improving. 4) Right upper quadrant pain: Patient admits to pain in the right lateral abdominal area x few months. Will have radiology review imaging from 10/2021. Appears to be musculoskeletal in nature.   1) Melanoma:  C12 Nivolumab. He is tolerating treatment well and reports no significant irAEs. Labs reviewed with patient on 1/4/2022, no acute abnormality. CT chest, abdomen/pelvis ordered. He has no restrictions with his ADLs. He is now ice skating. Diet and weight remain stable. He is being followed by Dr. 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  Currently on Levothyroxine 137 mcg.  3/1/2022 1)Melanoma C13 Nivolumab today. He is tolerating treatment extremely well and reports no significant irAEs. Patient underwent CT chest, abdomen/pelvis on 2/15/2022 which demonstrated the previously noted right lower lobe nodule no longer visualized. Otherwise, stable exam. He was evaluated by endocrine for hypothyroid, patient is now on Levothyroxine 150 mcg daily. He has no restrictions with his ADLs. Patient admits to grade one fatigue but is able to push through . He is able to swim ~ 2 miles/day.  Continues UV q 2 weeks, and will discuss stopping it. Notes dry mouth and taste.  3/29/2022: C14 Nivolumab today. He is tolerating treatment relatively well and reports no significant irAEs. He has no restrictions with his ADLs. Labs reviewed and LFT's are trending upwards. Grade one fatigue but is able to push through. He is currently on Levothyroxine 150 mcg daily and doing well. He is adjusting to the dose and will not start Cytomel at this time. Due to dry mouth Sjogren syndrome labs were ordered however not performed last visit, and will do so this time. He does note improved taste after a course of clotrimazole.  4/26/2022: C15 Nivolumab today. Labs reviewed, no acute abnormality. Thyroid functions remain WNL. Sjogren labs performed due to dry mouth, labs WNL. Tolerating treatment extremely well and reports on significant irAEs. Danny (Signatera) performed on 3/1/2022: Signatera Positive ; MTM/mL: 0.65  5/24/22 Patient will have C16 nivolumab. Repeat ct DNA next cycle. If positive, get PET. If negative, consider a few more treatments and stop or complete 2 year course. Thyroid issue seems to be addressed. Phototherapy is now once a month. Had stopped and then restarted due to increase in dermatitis. Mental state is positive. Remains underactive in terms of exercise. Has been supporting his father and family with medical issues.  7/19/2022 C18 Nivolumab today. Patient is tolerating treatment well and reports no significant irAEs. Patient is no longer receiving light therapy from dermatology. Patient is currently on Levothyroxine from 150 mcg to 137 mcg. Admits to grade one fatigue intermittently but has improved. He is now back to baseline. He continues to swim and has no restrictions with his ADLs.  8/16/22 C19 Nivolumab Doing well overall. Lisa has converted to negative on 7/19/22 Will continue CT scan q 4 months. Needs full TFTs today. Able to swim for long periods of time. Stopped phototherapy for  checkpoint inhibitor induced dermatitis. His only complaint is ear scaling.  9/15/2022: C20 Nivolumab on 9/13/2022 He is doing well on treatment and voices no major irAEs. Grade one fatigue but is able to push through. We reviewed his CT chest, abdomen/pelvis on 8/2022. Next CT 12/2022. Lisa on draw#2 negative on 7/29/2022 Next draw 10/2022.  10/07/2022 S/P C20 Nivolumab on 09/13/2022, and next cycle will be on 10/11/2022 He c/o pain to left side lower back. Also, he has some fatigue. His ear infection has cleared with use of medicated drops. CT-Scan of chest of 8/25/2022 showed slight increase of right chest wall nodule. Will get US and possible biopsy of nodule to r/o abnormal pathology.  11/08/2022: C22 Nivolumab today. US biopsy of chest wall negative on 10/2022. Patient is feeling well today and voices no major irAEs. Admits to grade one fatigue intermittently however is able to push through. Danny franklin D#3 today. Intermittent rash on his lower back and forehead.  He is using Triamcinolone on his lower back and Metronidazole on his forehead.  12/6/22 Patient here for C23 Will complete one more dose and then start surveillance with scans and ct-DNA q 3 months. Will obtain lab monitoring at home. He dose still have fatigue and thinks that it is related to low testosterone. Has not been sexually active.  01/03/2023 C24 Nivolumab today. Lisa Delgado D#3 on 11/2022 negative. D#4 02/2023 CT chest, abdomen/pelvis on 12/15/2022 which revealed no evidence of recurrent and/or metastatic disease. He is tolerating treatment relatively well and reports no major irAEs. Labs reviewed. Hormones are WNL.  01/31/2023: C25 Nivolumab today. Will treat for 26 cycles and stop. No major irAEs. D#4 Signatera on 01/03/2023 negative. He is doing well and voices no major complaints. Patient has gained weight while on treatment. Has not been exercising as often.  02/28/2023 C26 Nivolumab today (last treatment) No major irAEs. CT chest, abdomen/pelvis on 03/02/2023. He was seen by cardiology (Dr. Bingham) due to CORLEY. He underwent ECG and TTE which revealed no acute abnormality. His EF is 59%, normal LV systolic function. His exercise induced stress test did not reveal any acute abnormality. He was advised to undergo CT chest (calcium score) and will schedule.  07/07/2023 He is here for his follow up for metastatic melanoma. He was seen by Dr. Zaragoza (Ortho) for bilateral foot pain, R>L and swelling x 3 weeks. Patient has been doing PT and home stretch exercises which has been helping. His last signatera on 04/2023 was negative. Will repeat today. Last CT on 03/2023 stable. Next CT on 09/2023. Patient continues to have issues with his energy level. He is resting more and not as active however has no restrictions with his routine/instrumental ADLs. Patient has gained weight.  3/7/2024 Signatera Danny 2/19/2024 = 0.00 CT 2/21/2024 LUNGS AND LARGE AIRWAYS: Patent central airways. Stable submillimeter right upper lobe pulmonary nodule on image 27 of series 303. Left lower lobe tiny nodules bilaterally seen on the current exam. Patient is again noted to be post right lower lobe wedge resection PLEURA: No pleural effusion. VESSELS: Within normal limits. HEART: Heart size is normal. No pericardial effusion. MEDIASTINUM AND BRIDGER: No lymphadenopathy. CHEST WALL AND LOWER NECK: Scattered subcutaneous nodules similar in appearance to prior study. Representative nodule on image 29 of series 303 measures 7 mm and is unchanged. Right axillary scarring is also unchanged. Right sided mild gynecomastia is stable as well. LIVER: Within normal limits. BILE DUCTS: Normal caliber. GALLBLADDER: Within normal limits. SPLEEN: Unremarkable PANCREAS: Within normal limits. ADRENALS: Within normal limits. KIDNEYS/URETERS: Within normal limits. BLADDER: Minimally distended. REPRODUCTIVE ORGANS: Prostate within normal limits. BOWEL: No bowel obstruction. Appendix is normal. A small hiatal hernia is seen with mild colonic diverticulosis without diverticulitis PERITONEUM: No ascites VESSELS: Within normal limits. RETROPERITONEUM/LYMPH NODES: No lymphadenopathy. Prominent bilateral inguinal lymph nodes are unchanged. ABDOMINAL WALL: 2 small fat-containing inguinal hernias. Small midline fat-containing ventral hernia. BONES: Degenerative changes. IMPRESSION: Stable examination.  Feeling well and voices no new complaints. Addressed all questions. Looking to lose some weight. Plans to go to the [facility]. He is looking to take care of his parents more here, and work with [facility] remotely.  7/1/24 Last Signatera Danny on 6/12 is not detected. Will repeat in October 2024. Feeling well and voices no major complaints. 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  10/3/2024 Patient with h/o melanoma just in lungs. He was on treatment for 2 years. Had imaging done at [facility]. I had it loaded. Will ask radiology to review the PET - there is a liver and spine T7 lesion. Had rise of Signatera.  10/23/24 He is for C1 Ipilimumab (3) + Nivolumab (1) Was evaluated by Dr. Jeff Briscoe (Urology) for sperm banking. Patient wanted to discuss his labs prior to receiving therapy. He was educated no contraindications to receive therapy with positive Hb AB. He was positive in the past (2/2021) and had negative antigens. He was told his syphillis titers were positive. Unable to located in his chart. Requested labs be sent over to our team. He will need to be seen one week prior to C2 for toxicity evaluation and labs.  11/6/24 Patient is here for toxicity evaluation s/p 1 cycle of Ipilimumab (3) + Nivolumab (1). Did well and voices no major irAEs. Mild headache one day which has subsided Labs today. 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 Mild fatigue but is able to push through.  11/25/24 S/P 2 cycles of Ipi 3 + Nivo 1 on 11/13. C3 is scheduled for 12/4. Patient completed SRS to T spine (T7) in 1 fraction on 11/19/24. Labs today. Doing well, no major irAEs. Admits to soreness in the T7 region after radiation. No major pain. Admits to dry mouth and was given Clotrimazole with relief.  12/19/24 Patient is here s/p 3 cycles of Ipi 3 and Nivo 1. C4 is scheduled for 12/26/24. Patient endorses headache in the back of his head and frontal lobe. It has been more consistent over the last 5-7 days. No triggering factors a/w pain. No trauma to area. Pain is very mild and abates without acute intervention. Does not wake him up in the middle of the night. No change in mental status. No history of migraines in the past.  1/29/25 He is s/p 1 cycle of Nivolumab 480 mg/dose on 1/23/25 He is s/p mechanical fall a few weeks and was seen by urgent care. Patient recalled the incident and did NOT his head. He landed on his right lower back/buttock area + right side of his body. He was able to get up and perform his routine/instrumental ADLs afterwards. Three days later patient was having severe pain in the right rib and went to urgent care. XRAYs were negative. He was given pain medication/muscle relaxers. PET/CT scheduled for 2/3/25  02/20/25 Reports occasional back soreness which is improving Has MRI cervical spine in a few days  Had thrush but thinks it has improved; it numbed his mouth so he stopped taking; currently has dry lips/mouth, inside of mouth feels burned like after eating something hot, taste feels different Skin feels very good, no rashes During last treatment had some prickling/small red dots in chest but resolved No headaches for over a month Occasional fatigue C2 nivolumab today Losing some weight intentionally from being busier; eating vegan and less processed foods, drinking more infused water  4/24/2025 C8 Nivolumab at 480 mg/dose. Signatera on 4/25 - not detected Doing well. Dry mouth has improved. Taste is returning. He is going to [facility]. Grade one fatigue but is able to push through. 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 Completed SBRT to the T spine cGy in 1 fraction 11/19/2024. Repeat MR T spine in mid-June 2025.  5/22/25 C9 Nivolumab today at 480 mg/dose. Doing well. No major irAEs. Grade one fatigue but is able to push through. Was seen by urology who ordered MR prostate (Dr. Edward Ignacio)  [de-identified] : Surgical Oncology: Santos Seals\par  Thoracic Surgeon: Gatito Woodard \par  \par  Pt's sister Yohana Baca is best contact:  [phone]\par  \par  Patient sister home: [phone]\par  Patient new cell: [phone]\par  Back up number - [phone]

## 2025-05-22 NOTE — PHYSICAL EXAM
[Restricted in physically strenuous activity but ambulatory and able to carry out work of a light or sedentary nature] : Status 1- Restricted in physically strenuous activity but ambulatory and able to carry out work of a light or sedentary nature, e.g., light house work, office work [Normal] : affect appropriate [de-identified] : dry MM [de-identified] : breathing comfortably on room air

## 2025-06-19 NOTE — HISTORY OF PRESENT ILLNESS
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  Oncologic History Patient noted development of a solitary hyperpigmented plaque over the right side of chest, which was gradually increasing in diameter with a slight change in color. Punch Biopsy on 3/2019. Excisional biopsy taken of lesion 9/13/2019. Wide local excision 11/13/2020: Pathology: Consistent with melanoma. Axilla lymph node: 1/1 consistent with melanoma. Thickness, ulceration status and IHC are not available at this time.  CT chest 9/2019: No evidence of metastatic disease 12/23/2020 CT head, chest and abdomen performed in the [facility]: CT head negative for metastatic disease; CT chest demonstrates an increase in the size of the previously seen 8x5 mm nodule in the upper outer right anterior chest which currently measures 14x10 mm.; CT abdomen no evidence of metastatic disease. CT chest - RLL 6 mm and LLL 3 mm (compared with CT 9/2019)  3/2/2021 Patient underwent Lung RLL wedge resection for an increasing lung nodule, pathology consistent with metstatsic melanoma   3/30/2021 Patient was started on Nivolumab for  metastatic melanoma:  CT chest 3/30/2021: In comparison with 12/9/2021, status post interval right lower lobe wedge resection. 6 mm right lower lobe nodule (series 2 image 96) is enlarged; previously 2 mm. The remaining 8 mm and smaller lung nodules annotated on series 2 are stable. Difficult to delineate asymmetric soft tissue density lateral to the right pectoral muscle (series 2 image 38) is not significantly changed in size but contains a new small focus of hypodensity (series 601B image 74), possibly postprocedural seroma.  9/18/2024 MRI Brain showed No evidence of brain metastases  9/18/2024 PET/CT showed FDG avid lesions as described above including gallbladder fossa and right T7 vertebral body  10/10/2024 MRI Thoracic spine Indeterminate enhancing T1 hypointense lesion within the right aspect of the T7 vertebral body, which exhibits FDG avidity on prior PET/CT dated 9/18/2024. No significant FDG avidity was seen in this region on prior PET/CT dated 2/9/2021. This lesion is suspicious for metastatic disease within the T7 vertebral body Indeterminate STIR hyperintense, possibly enhancing focus within the T10 vertebral body, measuring up to 3 mm, which may represent a tiny hemangioma versus metastatic disease within the T10 vertebral body. Attention on follow-up imaging is recommended.  10/18/2024 CT chest/abdomen/pelvis New hepatic segment 5 lesion which correlates with FDG avid focus seen in PET/CT, concerning for metastasis. There is a new lytic lesion in the T7 vertebral body which correlates with FDG avid focus seen in PET/CT, also concerning for metastasis.  10/21/24 Patient presents to discuss the role of radiation therapy in his care. Denies pain. Generally doing well.   11/19/24 Completed planned RT to Spine T7 total dose 1800 cGy in 1 fraction.   11/21/24 MRI RT Foot IMPRESSION: Findings concerning for an evolving stress fracture within the second proximal phalanx  1/06/25 CT Chest/Abdomen/Pelvis IMPRESSION: New metastatic portacaval lymph node. A few subcentimeter hypoattenuating liver lesions as detailed above, new from 10/17/2024, concerning for metastases. Previously seen 1.0 cm lesion in hepatic segment 5 is slightly decreased in size from 10/17/2024. T7 vertebral body lytic lesion is increased in size. No new pulmonary nodules or evidence of metastatic disease in the chest.  2/24/25 MRI Thoracic Spine IMPRESSION: T7 vertebral body lesion is again seen and slightly increased in size with minimal epidural extension of underlying process seen involving the right ventral epidural region.  2/03/25 PET/CT IMPRESSION: Compared to the 9/18/2024 FDG PET/CT: 1. Interval resolution of the previously noted hypermetabolic lateral right hepatic lobe focus, compatible for favorable response to therapy. 2. Interval resolution of the T7 vertebral hypermetabolic foci, with persistent lytic lesion. 3. New mild hypermetabolism to a new tiny 3 mm lytic/lucent focus within the right lateral eighth rib , worrisome for new metastasis. 4. NEW moderate right nonspecific hypermetabolism within nondisplaced fractures of the RIGHT seventh eighth and ninth posterior ribs; pathologic fractures are not excluded. New focal hypermetabolism is also visualized within the intercostal region/pleural space between the posterolateral right sixth and seventh ribs, indeterminant possibly sequela from rib trauma, potential pleural metastasis is not excluded. Attention on surveillance. 5. No focal hypermetabolism is visualized within the portacaval region; there is been interval decreased size of the enlarged portacaval node of seen on 1/6/2025 CT. 6. Intense hypermetabolism is now visualized within both testes, significantly increased FDG activity from prior PET, where there is mild to moderate activity; this is uncertain significance, potentially may be inflammatory; consider correlation with the Scrotal ultrasound. 7.  New moderate activity within an indeterminant left inguinal node, which has mildly intervally decreased size, but with mildly increased activity.  6/04/24 MRI Spine Thoracic IMPRESSION: Abnormal lesion involving the T7 vertebral body is again identified. Previously noted minimal epidural extension of tumor is slightly less conspicuous when with the compared prior exam.  6/16/25 Patient presents today for follow up. Denies pain. Overall doing well.  Continues follow up with Med/Onc Dr. Ayala on 5/22/25 - continues on Nivolumab. Saw [facility] Dr. Schaffer on 2/27/25 - as per note Nasal endoscopy shows Right DNS, Moderate Inferior Turbinates Hypertrophy, No polyps, purulence or masses, Posterior Nasal pharynx Cobblestone/Clear Drainage, Moderate mucus production coating nasal cavity  Feeling well. Denies significant back pain at this time, just once in a while.

## 2025-06-19 NOTE — HISTORY OF PRESENT ILLNESS
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  Oncologic History Patient noted development of a solitary hyperpigmented plaque over the right side of chest, which was gradually increasing in diameter with a slight change in color. Punch Biopsy on 3/2019. Excisional biopsy taken of lesion 9/13/2019. Wide local excision 11/13/2020: Pathology: Consistent with melanoma. Axilla lymph node: 1/1 consistent with melanoma. Thickness, ulceration status and IHC are not available at this time.  CT chest 9/2019: No evidence of metastatic disease 12/23/2020 CT head, chest and abdomen performed in the [facility]: CT head negative for metastatic disease; CT chest demonstrates an increase in the size of the previously seen 8x5 mm nodule in the upper outer right anterior chest which currently measures 14x10 mm.; CT abdomen no evidence of metastatic disease. CT chest - RLL 6 mm and LLL 3 mm (compared with CT 9/2019)  3/2/2021 Patient underwent Lung RLL wedge resection for an increasing lung nodule, pathology consistent with metstatsic melanoma   3/30/2021 Patient was started on Nivolumab for  metastatic melanoma:  CT chest 3/30/2021: In comparison with 12/9/2021, status post interval right lower lobe wedge resection. 6 mm right lower lobe nodule (series 2 image 96) is enlarged; previously 2 mm. The remaining 8 mm and smaller lung nodules annotated on series 2 are stable. Difficult to delineate asymmetric soft tissue density lateral to the right pectoral muscle (series 2 image 38) is not significantly changed in size but contains a new small focus of hypodensity (series 601B image 74), possibly postprocedural seroma.  9/18/2024 MRI Brain showed No evidence of brain metastases  9/18/2024 PET/CT showed FDG avid lesions as described above including gallbladder fossa and right T7 vertebral body  10/10/2024 MRI Thoracic spine Indeterminate enhancing T1 hypointense lesion within the right aspect of the T7 vertebral body, which exhibits FDG avidity on prior PET/CT dated 9/18/2024. No significant FDG avidity was seen in this region on prior PET/CT dated 2/9/2021. This lesion is suspicious for metastatic disease within the T7 vertebral body Indeterminate STIR hyperintense, possibly enhancing focus within the T10 vertebral body, measuring up to 3 mm, which may represent a tiny hemangioma versus metastatic disease within the T10 vertebral body. Attention on follow-up imaging is recommended.  10/18/2024 CT chest/abdomen/pelvis New hepatic segment 5 lesion which correlates with FDG avid focus seen in PET/CT, concerning for metastasis. There is a new lytic lesion in the T7 vertebral body which correlates with FDG avid focus seen in PET/CT, also concerning for metastasis.  10/21/24 Patient presents to discuss the role of radiation therapy in his care. Denies pain. Generally doing well.   11/19/24 Completed planned RT to Spine T7 total dose 1800 cGy in 1 fraction.   11/21/24 MRI RT Foot IMPRESSION: Findings concerning for an evolving stress fracture within the second proximal phalanx  1/06/25 CT Chest/Abdomen/Pelvis IMPRESSION: New metastatic portacaval lymph node. A few subcentimeter hypoattenuating liver lesions as detailed above, new from 10/17/2024, concerning for metastases. Previously seen 1.0 cm lesion in hepatic segment 5 is slightly decreased in size from 10/17/2024. T7 vertebral body lytic lesion is increased in size. No new pulmonary nodules or evidence of metastatic disease in the chest.  2/24/25 MRI Thoracic Spine IMPRESSION: T7 vertebral body lesion is again seen and slightly increased in size with minimal epidural extension of underlying process seen involving the right ventral epidural region.  2/03/25 PET/CT IMPRESSION: Compared to the 9/18/2024 FDG PET/CT: 1. Interval resolution of the previously noted hypermetabolic lateral right hepatic lobe focus, compatible for favorable response to therapy. 2. Interval resolution of the T7 vertebral hypermetabolic foci, with persistent lytic lesion. 3. New mild hypermetabolism to a new tiny 3 mm lytic/lucent focus within the right lateral eighth rib , worrisome for new metastasis. 4. NEW moderate right nonspecific hypermetabolism within nondisplaced fractures of the RIGHT seventh eighth and ninth posterior ribs; pathologic fractures are not excluded. New focal hypermetabolism is also visualized within the intercostal region/pleural space between the posterolateral right sixth and seventh ribs, indeterminant possibly sequela from rib trauma, potential pleural metastasis is not excluded. Attention on surveillance. 5. No focal hypermetabolism is visualized within the portacaval region; there is been interval decreased size of the enlarged portacaval node of seen on 1/6/2025 CT. 6. Intense hypermetabolism is now visualized within both testes, significantly increased FDG activity from prior PET, where there is mild to moderate activity; this is uncertain significance, potentially may be inflammatory; consider correlation with the Scrotal ultrasound. 7.  New moderate activity within an indeterminant left inguinal node, which has mildly intervally decreased size, but with mildly increased activity.  6/04/24 MRI Spine Thoracic IMPRESSION: Abnormal lesion involving the T7 vertebral body is again identified. Previously noted minimal epidural extension of tumor is slightly less conspicuous when with the compared prior exam.  6/16/25 Patient presents today for follow up. Denies pain. Overall doing well.  Continues follow up with Med/Onc Dr. Ayala on 5/22/25 - continues on Nivolumab. Saw [facility] Dr. Schaffer on 2/27/25 - as per note Nasal endoscopy shows Right DNS, Moderate Inferior Turbinates Hypertrophy, No polyps, purulence or masses, Posterior Nasal pharynx Cobblestone/Clear Drainage, Moderate mucus production coating nasal cavity  Feeling well. Denies significant back pain at this time, just once in a while.

## 2025-06-19 NOTE — REVIEW OF SYSTEMS
[Negative] : Allergic/Immunologic [Fatigue: Grade 1 - Fatigue relieved by rest] : Fatigue: Grade 1 - Fatigue relieved by rest [FreeTextEntry4] : dry mouth [FreeTextEntry9] : once in a while has a little bit of back pain but not bad [de-identified] : had headaches which had subsided

## 2025-06-19 NOTE — ASSESSMENT
[FreeTextEntry1] : Patient has melanoma on right chest resected 11/2020. A positive right axillary node also found. On CT scan 12/2020 there are 2 subcentimeter nodules that are reported to be new since 9/2019. Patient has returned from [facility] for further evaluation and treatment. This is considered Stage 3b. PET and CT are reviewed today. The CT shows nodules, and patient underwent biopsy with Dr Woodard.   Nivolumab  C1 3/30/2021. C26 02/28/2023 - last treatment.  Danny (Signatera) performed on 3/1/2022: Signatera Positive had converted to negative on 7/19/2022.  Then it turned positive again August 29, 2024 ctDNA = 10.11  Patient with h/o melanoma just in lungs. He was on treatment for 2 years. Had imaging done at [facility]. I had it loaded. Will ask radiology to review the PET - there is a liver and spine T7 lesion. Had rise of Signatera.  IPI 3 + NIVO 1 C1D1 10/23 C2 11/13 C3 12/4 C4 12/26 C1 maintenance Nivolumab 1/23/25 He is s/p 1 cycle of Nivolumab 480 mg/dose on 1/23/25 C2 maintenance nivolumab 2/20/25 C7 3/20/25 C8 4/24/25  C9 5/22/25 C10 6/19/25  No major irAEs. Grade one fatigue but is able to push through.  Signatera  Jun 08, 2025 0.00 Apr 24, 2025 0.00 Apr 11, 2025 0.00 Feb 20, 2025 0.00 Feb 12, 2025 0.00 Dec 26, 2024 45.47 Nov 25, 2024 43.55 Aug 29, 2024 10.11 Jun 12, 2024 0.00 Feb 19, 2024 0.00 Nov 28, 2023 0.00  Reviewed recent PET/CT scan with Mr. Robles, showing there is response, and therefore we will continue maintenance nivolumab.  2/2025 PET/CT showed very good response. Will repeat PET prior to travel to confirm ongoing response.  Was seen by Dr. Sanchez due to his RT to T7 lesion. Completed SBRT to the T spine cgy in 1 fraction 11/19/2024. Repeat MR T spine in mid-June 2025 showed post-radiation effect.  Hypothyroid:  Levothyroxine 100 mcg daily.  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 Jeff Paez US testes negative.  Will repeat CT in 3-4 months. Continue to monitor Signatera  f/u q cycle.

## 2025-06-19 NOTE — REVIEW OF SYSTEMS
[Negative] : Allergic/Immunologic [Fatigue: Grade 1 - Fatigue relieved by rest] : Fatigue: Grade 1 - Fatigue relieved by rest [FreeTextEntry4] : dry mouth [FreeTextEntry9] : once in a while has a little bit of back pain but not bad [de-identified] : had headaches which had subsided

## 2025-06-19 NOTE — REVIEW OF SYSTEMS
[Negative] : Allergic/Immunologic [Fatigue: Grade 1 - Fatigue relieved by rest] : Fatigue: Grade 1 - Fatigue relieved by rest [FreeTextEntry4] : dry mouth [FreeTextEntry9] : once in a while has a little bit of back pain but not bad [de-identified] : had headaches which had subsided

## 2025-06-19 NOTE — HISTORY OF PRESENT ILLNESS
[de-identified] : Mr. Robles is a 56 year old gentlemen with no significant past medical history presenting to the office for an initial consultation of melanoma.  Patient has been in the [facility] for 6 years. 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 Lived in most of [place] for over 20 years.  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 Has been an athlete for many decades. He is vegan. He swims a up to a couple miles daily.  He is open to complementary medicine, and meditation.   Patient noted development of a solitary hyperpigmented plaque over the right side of chest, which was gradually increasing in diameter with a slight change in color. Patient has history of significant sun exposure and was not fond of using sun protection. Punch Biopsy on 3/2019. Excisional biopsy taken of lesion 9/13/2019. Wide local excision 11/13/2020: Pathology: Consistent with melanoma. Axilla lymph node: 1/1 consistent with melanoma. Thickness, ulceration status and IHC are not available at this time.  CT chest 9/2019: No evidence of metastatic disease 12/23/2020 CT head, chest and abdomen performed in the [facility]: CT head negative for metastatic disease; CT chest demonstrates an increase in the size of the previously seen 8x5 mm nodule in the upper outer right anterior chest which currently measures 14x10 mm.; CT abdomen no evidence of metastatic disease. CT chest - RLL 6 mm and LLL 3 mm (compared with CT 9/2019).   2/19/21: I discussed PET scan and CT with radiologist. Lung lesions too small to confirm melanoma by CT biopsy. Thoracic surgeon would need to be consulted. I called Dr. Woodard, and he believes that the lesion in the right lung is accessible surgically. Also he thinks that there is a possibility of infection as opposed to melanoma. Patient is aware of this and is making an appointment. Patient slides obtained, and confirmed Stage 3b. We discussed that if the lungs are negative for melanoma, then would recommend 1 year adjuvant Opdivo as adjuvant.  3/12/2021 Patient here to review pathology, labs, films. He feels well and is able to play tennis  3/30/2021: 1) metastatic melanoma: Patient is here for C1 NIvolumab.He is feeling well physically and psychologically and ready to start treatment.  We re-reviewed logistics, mechanism of action and most common irAEs from Nivolumab. CT chest 3/30/2021: In comparison with 12/9/2021, status post interval right lower lobe wedge resection. 6 mm right lower lobe nodule (series 2 image 96) is enlarged; previously 2 mm. The remaining 8 mm and smaller lung nodules annotated on series 2 are stable. Difficult to delineate asymmetric soft tissue density lateral to the right pectoral muscle (series 2 image 38) is not significantly changed in size but contains a new small focus of hypodensity (series 601B image 74), possibly postprocedural seroma. 2) COVID-19 infection: Patient was tested positive for COVID-19 on 3/16.  He is asymptomatic and denies fever, chills, change in taste/smell, cough, SOB , diarrhea or fatigue. 3) Vaccine: Rah and Rah vaccine was given on 3/25/2021.   4/14/2021: 1) Metastatic melanoma: Patient is here for toxicity evaluation s/p one cycle of Nivolumab on 3/30/2021.  Patient did well and reports no significant irAEs.  Denies fever, chills, fatigue, arthralgia, myalgia, cough, rash, diarrhea, cough or chest pain. 2) Facial burning: Patient underwent Blue light procedure on 4/13/2021 with Dermatology.  He noticed "burning" on his face a couple of hour after his procedure which is causing some irritation.  He is scheduled to be evaluated by dermatology today.  5/11/2021: Metastatic melanoma: Patient is here for toxicity evaluation s/p two cycle of Nivolumab. Patient did well and reports no significant irAEs.  Denies fever, chills, fatigue, arthralgia, myalgia, cough, rash, diarrhea, cough or chest pain. COVID side effects have improved and completely resolved. No irAEs Has started swimming again [employer] after C#4  5/25/2021: 1) melanoma: Patient is here for C3 Nivolumab.  He is doing well and reports no significant irAEs.   Denies fever, chills, fatigue, arthralgia, myalgia, cough, rash, diarrhea, cough or chest pain. Patient admits to grade one fatigue which he is able to push through.  No restrictions with his ADLs.  He is able to swim. We reviewed labs, no acute abnormality noted.  WIll recheck TSH levels today.  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 patient had covid and is feeling better but not at baseline - he is able to swim long distances but notes he gets fatigued at times when walks distances Due for CT scan next week.  9/14/2021: 1) Melanoma: Patient is here for C7 Nivolumab. Patient is doing well on treatment and voices no new complaints. Denies any significant irAEs. Denies fever, chills,  cough, SOB, arthralgia, myalgia, diarrhea. 2) checkpoint inhibitor induced dermatitis, : Has improved. Currently not using any ointments or cream.  Patient has been moisturizer. 3) Hypothyroid: Patient was evaluated by Endocrine, Dr. Martell who recommended Levothyroxine 125mcg Her thyroid functions are improving  4) Social: Patient is currently living at apartment close to [facility] house. He is currently not working.  11/9/2021: 1) Melanoma: Patient is here for C9 Nivolumab. Patient is doing well on treatment and voices no new complaints. Denies any significant irAEs today.  Denies fever, chills, cough, SOB, arthralgia, myalgia, diarrhea. 2) checkpoint inhibitor induced dermatitis: Has improved on phototherapy & topical steroids not currently using any ointments or cream.  Patient has been moisturizer. 3) Hypothyroid: Patient was evaluated by Endocrine, Dr. Martell who recommended Levothyroxine 137 mcg Her thyroid functions are improving. 4) Right upper quadrant pain: Patient admits to pain in the right lateral abdominal area x few months. Will have radiology review imaging from 10/2021. Appears to be musculoskeletal in nature.   1) Melanoma:  C12 Nivolumab. He is tolerating treatment well and reports no significant irAEs. Labs reviewed with patient on 1/4/2022, no acute abnormality. CT chest, abdomen/pelvis ordered. He has no restrictions with his ADLs. He is now ice skating. Diet and weight remain stable. He is being followed by Dr. 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  Currently on Levothyroxine 137 mcg.  3/1/2022 1)Melanoma C13 Nivolumab today. He is tolerating treatment extremely well and reports no significant irAEs. Patient underwent CT chest, abdomen/pelvis on 2/15/2022 which demonstrated the previously noted right lower lobe nodule no longer visualized. Otherwise, stable exam. He was evaluated by endocrine for hypothyroid, patient is now on Levothyroxine 150 mcg daily. He has no restrictions with his ADLs. Patient admits to grade one fatigue but is able to push through . He is able to swim ~ 2 miles/day.  Continues UV q 2 weeks, and will discuss stopping it. Notes dry mouth and taste.  3/29/2022: C14 Nivolumab today. He is tolerating treatment relatively well and reports no significant irAEs. He has no restrictions with his ADLs. Labs reviewed and LFT's are trending upwards. Grade one fatigue but is able to push through. He is currently on Levothyroxine 150 mcg daily and doing well. He is adjusting to the dose and will not start Cytomel at this time. Due to dry mouth Sjogren syndrome labs were ordered however not performed last visit, and will do so this time. He does note improved taste after a course of clotrimazole.  4/26/2022: C15 Nivolumab today. Labs reviewed, no acute abnormality. Thyroid functions remain WNL. Sjogren labs performed due to dry mouth, labs WNL. Tolerating treatment extremely well and reports on significant irAEs. Danny (Signatera) performed on 3/1/2022: Signatera Positive ; MTM/mL: 0.65  5/24/22 Patient will have C16 nivolumab. Repeat ct DNA next cycle. If positive, get PET. If negative, consider a few more treatments and stop or complete 2 year course. Thyroid issue seems to be addressed. Phototherapy is now once a month. Had stopped and then restarted due to increase in dermatitis. Mental state is positive. Remains underactive in terms of exercise. Has been supporting his father and family with medical issues.  7/19/2022 C18 Nivolumab today. Patient is tolerating treatment well and reports no significant irAEs. Patient is no longer receiving light therapy from dermatology. Patient is currently on Levothyroxine from 150 mcg to 137 mcg. Admits to grade one fatigue intermittently but has improved. He is now back to baseline. He continues to swim and has no restrictions with his ADLs.  8/16/22 C19 Nivolumab Doing well overall. Lisa has converted to negative on 7/19/22 Will continue CT scan q 4 months. Needs full TFTs today. Able to swim for long periods of time. Stopped phototherapy for  checkpoint inhibitor induced dermatitis. His only complaint is ear scaling.  9/15/2022: C20 Nivolumab on 9/13/2022 He is doing well on treatment and voices no major irAEs. Grade one fatigue but is able to push through. We reviewed his CT chest, abdomen/pelvis on 8/2022. Next CT 12/2022. Lisa on draw#2 negative on 7/29/2022 Next draw 10/2022.  10/07/2022 S/P C20 Nivolumab on 09/13/2022, and next cycle will be on 10/11/2022 He c/o pain to left side lower back. Also, he has some fatigue. His ear infection has cleared with use of medicated drops. CT-Scan of chest of 8/25/2022 showed slight increase of right chest wall nodule. Will get US and possible biopsy of nodule to r/o abnormal pathology.  11/08/2022: C22 Nivolumab today. US biopsy of chest wall negative on 10/2022. Patient is feeling well today and voices no major irAEs. Admits to grade one fatigue intermittently however is able to push through. Danny franklin D#3 today. Intermittent rash on his lower back and forehead.  He is using Triamcinolone on his lower back and Metronidazole on his forehead.  12/6/22 Patient here for C23 Will complete one more dose and then start surveillance with scans and ct-DNA q 3 months. Will obtain lab monitoring at home. He dose still have fatigue and thinks that it is related to low testosterone. Has not been sexually active.  01/03/2023 C24 Nivolumab today. Lisa Delgado D#3 on 11/2022 negative. D#4 02/2023 CT chest, abdomen/pelvis on 12/15/2022 which revealed no evidence of recurrent and/or metastatic disease. He is tolerating treatment relatively well and reports no major irAEs. Labs reviewed. Hormones are WNL.  01/31/2023: C25 Nivolumab today. Will treat for 26 cycles and stop. No major irAEs. D#4 Signatera on 01/03/2023 negative. He is doing well and voices no major complaints. Patient has gained weight while on treatment. Has not been exercising as often.  02/28/2023 C26 Nivolumab today (last treatment) No major irAEs. CT chest, abdomen/pelvis on 03/02/2023. He was seen by cardiology (Dr. Bingham) due to CORLEY. He underwent ECG and TTE which revealed no acute abnormality. His EF is 59%, normal LV systolic function. His exercise induced stress test did not reveal any acute abnormality. He was advised to undergo CT chest (calcium score) and will schedule.  07/07/2023 He is here for his follow up for metastatic melanoma. He was seen by Dr. Zaragoza (Ortho) for bilateral foot pain, R>L and swelling x 3 weeks. Patient has been doing PT and home stretch exercises which has been helping. His last signatera on 04/2023 was negative. Will repeat today. Last CT on 03/2023 stable. Next CT on 09/2023. Patient continues to have issues with his energy level. He is resting more and not as active however has no restrictions with his routine/instrumental ADLs. Patient has gained weight.  3/7/2024 Signatera Danny 2/19/2024 = 0.00 CT 2/21/2024 LUNGS AND LARGE AIRWAYS: Patent central airways. Stable submillimeter right upper lobe pulmonary nodule on image 27 of series 303. Left lower lobe tiny nodules bilaterally seen on the current exam. Patient is again noted to be post right lower lobe wedge resection PLEURA: No pleural effusion. VESSELS: Within normal limits. HEART: Heart size is normal. No pericardial effusion. MEDIASTINUM AND BRIDGER: No lymphadenopathy. CHEST WALL AND LOWER NECK: Scattered subcutaneous nodules similar in appearance to prior study. Representative nodule on image 29 of series 303 measures 7 mm and is unchanged. Right axillary scarring is also unchanged. Right sided mild gynecomastia is stable as well. LIVER: Within normal limits. BILE DUCTS: Normal caliber. GALLBLADDER: Within normal limits. SPLEEN: Unremarkable PANCREAS: Within normal limits. ADRENALS: Within normal limits. KIDNEYS/URETERS: Within normal limits. BLADDER: Minimally distended. REPRODUCTIVE ORGANS: Prostate within normal limits. BOWEL: No bowel obstruction. Appendix is normal. A small hiatal hernia is seen with mild colonic diverticulosis without diverticulitis PERITONEUM: No ascites VESSELS: Within normal limits. RETROPERITONEUM/LYMPH NODES: No lymphadenopathy. Prominent bilateral inguinal lymph nodes are unchanged. ABDOMINAL WALL: 2 small fat-containing inguinal hernias. Small midline fat-containing ventral hernia. BONES: Degenerative changes. IMPRESSION: Stable examination.  Feeling well and voices no new complaints. Addressed all questions. Looking to lose some weight. Plans to go to the [facility]. He is looking to take care of his parents more here, and work with [facility] remotely.  7/1/24 Last Signatera Danny on 6/12 is not detected. Will repeat in October 2024. Feeling well and voices no major complaints. 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  10/3/2024 Patient with h/o melanoma just in lungs. He was on treatment for 2 years. Had imaging done at [facility]. I had it loaded. Will ask radiology to review the PET - there is a liver and spine T7 lesion. Had rise of Signatera.  10/23/24 He is for C1 Ipilimumab (3) + Nivolumab (1) Was evaluated by Dr. Jeff Briscoe (Urology) for sperm banking. Patient wanted to discuss his labs prior to receiving therapy. He was educated no contraindications to receive therapy with positive Hb AB. He was positive in the past (2/2021) and had negative antigens. He was told his syphillis titers were positive. Unable to located in his chart. Requested labs be sent over to our team. He will need to be seen one week prior to C2 for toxicity evaluation and labs.  11/6/24 Patient is here for toxicity evaluation s/p 1 cycle of Ipilimumab (3) + Nivolumab (1). Did well and voices no major irAEs. Mild headache one day which has subsided Labs today. 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 Mild fatigue but is able to push through.  11/25/24 S/P 2 cycles of Ipi 3 + Nivo 1 on 11/13. C3 is scheduled for 12/4. Patient completed SRS to T spine (T7) in 1 fraction on 11/19/24. Labs today. Doing well, no major irAEs. Admits to soreness in the T7 region after radiation. No major pain. Admits to dry mouth and was given Clotrimazole with relief.  12/19/24 Patient is here s/p 3 cycles of Ipi 3 and Nivo 1. C4 is scheduled for 12/26/24. Patient endorses headache in the back of his head and frontal lobe. It has been more consistent over the last 5-7 days. No triggering factors a/w pain. No trauma to area. Pain is very mild and abates without acute intervention. Does not wake him up in the middle of the night. No change in mental status. No history of migraines in the past.  1/29/25 He is s/p 1 cycle of Nivolumab 480 mg/dose on 1/23/25 He is s/p mechanical fall a few weeks and was seen by urgent care. Patient recalled the incident and did NOT his head. He landed on his right lower back/buttock area + right side of his body. He was able to get up and perform his routine/instrumental ADLs afterwards. Three days later patient was having severe pain in the right rib and went to urgent care. XRAYs were negative. He was given pain medication/muscle relaxers. PET/CT scheduled for 2/3/25  02/20/25 Reports occasional back soreness which is improving Has MRI cervical spine in a few days  Had thrush but thinks it has improved; it numbed his mouth so he stopped taking; currently has dry lips/mouth, inside of mouth feels burned like after eating something hot, taste feels different Skin feels very good, no rashes During last treatment had some prickling/small red dots in chest but resolved No headaches for over a month Occasional fatigue C2 nivolumab today Losing some weight intentionally from being busier; eating vegan and less processed foods, drinking more infused water  4/24/2025 C8 Nivolumab at 480 mg/dose. Signatera on 4/25 - not detected Doing well. Dry mouth has improved. Taste is returning. He is going to [facility]. Grade one fatigue but is able to push through. 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 Completed SBRT to the T spine cGy in 1 fraction 11/19/2024. Repeat MR T spine in mid-June 2025.  6/19/2025 C10 Nivolumab today at 480 mg/dose. Doing well. No major irAEs. Grade one fatigue but is able to push through.  [de-identified] : Surgical Oncology: Santos Seals\par  Thoracic Surgeon: Gatito Woodard \par  \par  Pt's sister Yohana Baca is best contact:  [phone]\par  \par  Patient sister home: [phone]\par  Patient new cell: [phone]\par  Back up number - [phone]

## 2025-06-19 NOTE — REVIEW OF SYSTEMS
[Negative] : Allergic/Immunologic [Fatigue: Grade 1 - Fatigue relieved by rest] : Fatigue: Grade 1 - Fatigue relieved by rest [FreeTextEntry4] : dry mouth [FreeTextEntry9] : once in a while has a little bit of back pain but not bad [de-identified] : had headaches which had subsided

## 2025-06-19 NOTE — PHYSICAL EXAM
[Restricted in physically strenuous activity but ambulatory and able to carry out work of a light or sedentary nature] : Status 1- Restricted in physically strenuous activity but ambulatory and able to carry out work of a light or sedentary nature, e.g., light house work, office work [Normal] : affect appropriate [de-identified] : dry MM [de-identified] : breathing comfortably on room air

## 2025-06-19 NOTE — REVIEW OF SYSTEMS
ERROR   [Diarrhea: Grade 0] : Diarrhea: Grade 0 [Fever] : no fever [Chills] : no chills [Night Sweats] : no night sweats [Fatigue] : no fatigue [Dysphagia] : no dysphagia [Odynophagia] : no odynophagia [Chest Pain] : no chest pain [Palpitations] : no palpitations [Shortness Of Breath] : no shortness of breath [Wheezing] : no wheezing [Cough] : no cough [Abdominal Pain] : no abdominal pain [Vomiting] : no vomiting [Constipation] : no constipation [Dysuria] : no dysuria [Skin Rash] : no skin rash

## 2025-06-19 NOTE — HISTORY OF PRESENT ILLNESS
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  Oncologic History Patient noted development of a solitary hyperpigmented plaque over the right side of chest, which was gradually increasing in diameter with a slight change in color. Punch Biopsy on 3/2019. Excisional biopsy taken of lesion 9/13/2019. Wide local excision 11/13/2020: Pathology: Consistent with melanoma. Axilla lymph node: 1/1 consistent with melanoma. Thickness, ulceration status and IHC are not available at this time.  CT chest 9/2019: No evidence of metastatic disease 12/23/2020 CT head, chest and abdomen performed in the [facility]: CT head negative for metastatic disease; CT chest demonstrates an increase in the size of the previously seen 8x5 mm nodule in the upper outer right anterior chest which currently measures 14x10 mm.; CT abdomen no evidence of metastatic disease. CT chest - RLL 6 mm and LLL 3 mm (compared with CT 9/2019)  3/2/2021 Patient underwent Lung RLL wedge resection for an increasing lung nodule, pathology consistent with metstatsic melanoma   3/30/2021 Patient was started on Nivolumab for  metastatic melanoma:  CT chest 3/30/2021: In comparison with 12/9/2021, status post interval right lower lobe wedge resection. 6 mm right lower lobe nodule (series 2 image 96) is enlarged; previously 2 mm. The remaining 8 mm and smaller lung nodules annotated on series 2 are stable. Difficult to delineate asymmetric soft tissue density lateral to the right pectoral muscle (series 2 image 38) is not significantly changed in size but contains a new small focus of hypodensity (series 601B image 74), possibly postprocedural seroma.  9/18/2024 MRI Brain showed No evidence of brain metastases  9/18/2024 PET/CT showed FDG avid lesions as described above including gallbladder fossa and right T7 vertebral body  10/10/2024 MRI Thoracic spine Indeterminate enhancing T1 hypointense lesion within the right aspect of the T7 vertebral body, which exhibits FDG avidity on prior PET/CT dated 9/18/2024. No significant FDG avidity was seen in this region on prior PET/CT dated 2/9/2021. This lesion is suspicious for metastatic disease within the T7 vertebral body Indeterminate STIR hyperintense, possibly enhancing focus within the T10 vertebral body, measuring up to 3 mm, which may represent a tiny hemangioma versus metastatic disease within the T10 vertebral body. Attention on follow-up imaging is recommended.  10/18/2024 CT chest/abdomen/pelvis New hepatic segment 5 lesion which correlates with FDG avid focus seen in PET/CT, concerning for metastasis. There is a new lytic lesion in the T7 vertebral body which correlates with FDG avid focus seen in PET/CT, also concerning for metastasis.  10/21/24 Patient presents to discuss the role of radiation therapy in his care. Denies pain. Generally doing well.   11/19/24 Completed planned RT to Spine T7 total dose 1800 cGy in 1 fraction.   11/21/24 MRI RT Foot IMPRESSION: Findings concerning for an evolving stress fracture within the second proximal phalanx  1/06/25 CT Chest/Abdomen/Pelvis IMPRESSION: New metastatic portacaval lymph node. A few subcentimeter hypoattenuating liver lesions as detailed above, new from 10/17/2024, concerning for metastases. Previously seen 1.0 cm lesion in hepatic segment 5 is slightly decreased in size from 10/17/2024. T7 vertebral body lytic lesion is increased in size. No new pulmonary nodules or evidence of metastatic disease in the chest.  2/24/25 MRI Thoracic Spine IMPRESSION: T7 vertebral body lesion is again seen and slightly increased in size with minimal epidural extension of underlying process seen involving the right ventral epidural region.  2/03/25 PET/CT IMPRESSION: Compared to the 9/18/2024 FDG PET/CT: 1. Interval resolution of the previously noted hypermetabolic lateral right hepatic lobe focus, compatible for favorable response to therapy. 2. Interval resolution of the T7 vertebral hypermetabolic foci, with persistent lytic lesion. 3. New mild hypermetabolism to a new tiny 3 mm lytic/lucent focus within the right lateral eighth rib , worrisome for new metastasis. 4. NEW moderate right nonspecific hypermetabolism within nondisplaced fractures of the RIGHT seventh eighth and ninth posterior ribs; pathologic fractures are not excluded. New focal hypermetabolism is also visualized within the intercostal region/pleural space between the posterolateral right sixth and seventh ribs, indeterminant possibly sequela from rib trauma, potential pleural metastasis is not excluded. Attention on surveillance. 5. No focal hypermetabolism is visualized within the portacaval region; there is been interval decreased size of the enlarged portacaval node of seen on 1/6/2025 CT. 6. Intense hypermetabolism is now visualized within both testes, significantly increased FDG activity from prior PET, where there is mild to moderate activity; this is uncertain significance, potentially may be inflammatory; consider correlation with the Scrotal ultrasound. 7.  New moderate activity within an indeterminant left inguinal node, which has mildly intervally decreased size, but with mildly increased activity.  6/04/24 MRI Spine Thoracic IMPRESSION: Abnormal lesion involving the T7 vertebral body is again identified. Previously noted minimal epidural extension of tumor is slightly less conspicuous when with the compared prior exam.  6/16/25 Patient presents today for follow up. Denies pain. Overall doing well.  Continues follow up with Med/Onc Dr. Ayala on 5/22/25 - continues on Nivolumab. Saw [facility] Dr. Schaffer on 2/27/25 - as per note Nasal endoscopy shows Right DNS, Moderate Inferior Turbinates Hypertrophy, No polyps, purulence or masses, Posterior Nasal pharynx Cobblestone/Clear Drainage, Moderate mucus production coating nasal cavity  Feeling well. Denies significant back pain at this time, just once in a while.

## 2025-07-01 NOTE — ASSESSMENT
[FreeTextEntry1] : The patient returns for follow-up.  He was last seen March 27, 2025 he has also stored to specimen for cryopreservation.  He has blood studies that were done after his last visit to review.  His blood studies have demonstrated a positive for treponema palladium antibody and a negative RPR and confirmatory testing.  He is also positive for CMV IgG but negative for CMV IgG M.  His hepatitis B core antibody was also positive..  He completed his immunotherapy and was noted to have a very low testosterone.  He feels his libido is low and his energy level a bit low also.He was supposed to have started on clomiphene citrate 25 mg a day and then obtain blood studies 3 weeks after.  However, it does not appear blood studies were obtained.  Blood studies dated February 27, 2025 demonstrated a estradiol of 20 pg/mL, LH 17.7 IU/L, PSA 2.58 ng/mL, TSH 0.32 IU/mL, prolactin 5.9 ng/mL, FSH 25.5 IU/L, hemoglobin A1c 5.6%, testosterone free 0.0 pg/mL with a total testosterone of 36.8 ng/dL, testosterone bioavailable were also low.  He has some symptoms of low testosterone.  Nonetheless, he is interested in fertility and I have discussed with him starting on clomiphene citrate.  He was supposed to have started on clomiphene citrate 25 mg a day and then obtain blood studies 3 weeks after.  He had also seen Dr. Ignacio for his rising PSA.  Prostate MRI was essentially negative with a PI-RADS 2. A recent PET scan has a decreased pelvic node.  Prior semen analysis demonstrated very low number of sperm with poor motility and morphology.  IVF with single sperm injection would be the best options for him.    He has been taking clomid 25 mg a day. He feels the fatigue might be improved. He will be getting blood test to see how clomid is working.  He also has had erectile dysfunction (5 to 6/10). We discussed options. He would like to start Tadalafil 3 times a week. We discussed the relative risks and benefits and proper usage of the medication. He will be traveling I have asked him to try the medication before he leaves to make sure he has no adverse effects.  Which we discussed.  He will also be obtaining blood studies while on clomiphene citrate and we will review them before he leaves.  Telehealth Consultation: 35 minutes reviewing his history and discussing prior results, discussing various treatment options, writing prescriptions, writing requests for blood studies and reviewing his recent lab testing and writing his note. There was also additional time in preparing for the visit and assisting the patient with technology issues he was having with the telehealth platform.

## 2025-07-01 NOTE — HISTORY OF PRESENT ILLNESS
[FreeTextEntry1] : The patient-doctor. relationship has been established in a face-to-face fashion on real-time video audio HIPAA compliant communication using telemedicine software. The patient, Edward Robleswas at home and participated in the telephonic visit with the Physician Jeff Briscoe MD PhD who was in his medical office. The patient's identity has been confirmed.  The patient was previously emailed a copy of the telemedicine consent.  The patient has had a chance to review and has now given verbal consent and has requested care to be assessed and treated through telemedicine. The patient understands there may be limitations in this process and that they need not need further follow-up care in the office and/or hospital settings.   Verbal consent was given on Tuesday, July 1, 2025 at 9 AM by the patient.  It was requested by the physician.  A written consent was previously sent for the patient to sign and return.  The patient returns for follow-up.  He was last seen March 27, 2025 he has also stored to specimen for cryopreservation.  He has blood studies that were done after his last visit to review.  His blood studies have demonstrated a positive for treponema palladium antibody and a negative RPR and confirmatory testing.  He is also positive for CMV IgG but negative for CMV IgG M.  His hepatitis B core antibody was also positive..  He completed his immunotherapy and was noted to have a very low testosterone.  He feels his libido is low and his energy level a bit low also.He was supposed to have started on clomiphene citrate 25 mg a day and then obtain blood studies 3 weeks after.  However, it does not appear blood studies were obtained.  PMH: Patient is a 60-year-old gentleman with a history of metastatic melanoma who presents with the chief complaint of impaired fertility for evaluation. The patient denies fevers, chills, nausea and/or vomiting and no unexplained weight loss.   I reviewed the questionnaire he had completed with him in detail.  His partner, age 33, was not able to accompany him to the visit today ([facility]). She has not had any prior pregnancies. As I understand her evaluation has been normal to date. They have been trying to achieve pregnancy for the past 6 months without conception. This issue causes both he and his partner significant distress.   He has no known drug allergies.  His past medical history demonstrates no significant urologic issues (see patient questionnaire).  In his present occupation as a he has no known toxin exposure.  He does not smoke and drinks only socially.  He has no known drug allergies.  His review of systems is non-contributory. His family history is not significant. A chaperone was offered to be present for the examination but declined by the patient.

## 2025-07-01 NOTE — HISTORY OF PRESENT ILLNESS
[FreeTextEntry1] : The patient-doctor. relationship has been established in a face-to-face fashion on real-time video audio HIPAA compliant communication using telemedicine software. The patient, Edward Robleswas at home and participated in the telephonic visit with the Physician Jeff Briscoe MD PhD who was in his medical office. The patient's identity has been confirmed.  The patient was previously emailed a copy of the telemedicine consent.  The patient has had a chance to review and has now given verbal consent and has requested care to be assessed and treated through telemedicine. The patient understands there may be limitations in this process and that they need not need further follow-up care in the office and/or hospital settings.   Verbal consent was given on Tuesday, July 1, 2025 at 9 AM by the patient.  It was requested by the physician.  A written consent was previously sent for the patient to sign and return.  The patient returns for follow-up.  He was last seen March 27, 2025 he has also stored to specimen for cryopreservation.  He has blood studies that were done after his last visit to review.  His blood studies have demonstrated a positive for treponema palladium antibody and a negative RPR and confirmatory testing.  He is also positive for CMV IgG but negative for CMV IgG M.  His hepatitis B core antibody was also positive..  He completed his immunotherapy and was noted to have a very low testosterone.  He feels his libido is low and his energy level a bit low also.He was supposed to have started on clomiphene citrate 25 mg a day and then obtain blood studies 3 weeks after.  However, it does not appear blood studies were obtained.  PMH: Patient is a 60-year-old gentleman with a history of metastatic melanoma who presents with the chief complaint of impaired fertility for evaluation. The patient denies fevers, chills, nausea and/or vomiting and no unexplained weight loss.   I reviewed the questionnaire he had completed with him in detail.  His partner, age 33, was not able to accompany him to the visit today ([facility]). She has not had any prior pregnancies. As I understand her evaluation has been normal to date. They have been trying to achieve pregnancy for the past 6 months without conception. This issue causes both he and his partner significant distress.   He has no known drug allergies.  His past medical history demonstrates no significant urologic issues (see patient questionnaire).  In his present occupation as a he has no known toxin exposure.  He does not smoke and drinks only socially.  He has no known drug allergies.  His review of systems is non-contributory. His family history is not significant. A chaperone was offered to be present for the examination but declined by the patient.

## 2025-07-22 NOTE — PHYSICAL EXAM
[Restricted in physically strenuous activity but ambulatory and able to carry out work of a light or sedentary nature] : Status 1- Restricted in physically strenuous activity but ambulatory and able to carry out work of a light or sedentary nature, e.g., light house work, office work [Normal] : affect appropriate [de-identified] : dry MM [de-identified] : breathing comfortably on room air

## 2025-07-22 NOTE — ASSESSMENT
[FreeTextEntry1] : Patient has melanoma on right chest resected 11/2020. A positive right axillary node also found. On CT scan 12/2020 there are 2 subcentimeter nodules that are reported to be new since 9/2019. Patient has returned from [facility] for further evaluation and treatment. This is considered Stage 3b. PET and CT are reviewed today. The CT shows nodules, and patient underwent biopsy with Dr Woodard.   Nivolumab  C1 3/30/2021. C26 02/28/2023 - last treatment.  Danny (Signatera) performed on 3/1/2022: Signatera Positive had converted to negative on 7/19/2022.  Then it turned positive again August 29, 2024 ctDNA = 10.11  Patient with h/o melanoma just in lungs. He was on treatment for 2 years. Had imaging done at [facility]. I had it loaded. Will ask radiology to review the PET - there is a liver and spine T7 lesion. Had rise of Signatera.  IPI 3 + NIVO 1 C1D1 10/23 C2 11/13 C3 12/4 C4 12/26 C1 maintenance Nivolumab 1/23/25 He is s/p 1 cycle of Nivolumab 480 mg/dose on 1/23/25 C2 maintenance nivolumab 2/20/25 C7 3/20/25 C8 4/24/25  C9 5/22/25 C10 6/19/25 C11 7/22/25  Signatera  Jun 08, 2025 0.00 Apr 24, 2025 0.00 Apr 11, 2025 0.00 Feb 20, 2025 0.00 Feb 12, 2025 0.00 Dec 26, 2024 45.47 Nov 25, 2024 43.55 Aug 29, 2024 10.11 Jun 12, 2024 0.00 Feb 19, 2024 0.00 Nov 28, 2023 0.00  Reviewed recent PET/CT scan with Mr. Robles, showing there is response, and therefore we will continue maintenance nivolumab.  2/2025 PET/CT showed very good response.  Was seen by Dr. Sanchez due to his RT to T7 lesion. Completed SBRT to the T spine cgy in 1 fraction 11/19/2024. Repeat MR T spine in mid-June 2025 showed post-radiation effect.  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 Jeff Paez US testes negative.  7/22/25:  C11 Nivolumab today.  US neck was performed which revealed left neck at level 2, there is a round lymph node with no clear fatty hilum, measuring 1.1 x 0.7 x 0.8 cm. Subsequently, he underwent biopsy of the on 7/15 which was negative for malignancy. Of note, patient underwent left dental extraction at [facility]. He was noted to have an infection and cavity and underwent extraction prior to his PET/CT in early-mid June 2025.  His treatment was delayed due to travel overseas however patient did not go. He is considering going in the next couple of weeks however will need to delay his treatment further which is not recommended.  No major irAEs noted. Labs reviewed.  f/u q cycle.  Dr. Ayala agrees with above plan.

## 2025-07-22 NOTE — HISTORY OF PRESENT ILLNESS
[de-identified] : Mr. Robles is a 56 year old gentlemen with no significant past medical history presenting to the office for an initial consultation of melanoma.  Patient has been in the [facility] for 6 years. 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 Lived in most of [place] for over 20 years.  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 Has been an athlete for many decades. He is vegan. He swims a up to a couple miles daily.  He is open to complementary medicine, and meditation.   Patient noted development of a solitary hyperpigmented plaque over the right side of chest, which was gradually increasing in diameter with a slight change in color. Patient has history of significant sun exposure and was not fond of using sun protection. Punch Biopsy on 3/2019. Excisional biopsy taken of lesion 9/13/2019. Wide local excision 11/13/2020: Pathology: Consistent with melanoma. Axilla lymph node: 1/1 consistent with melanoma. Thickness, ulceration status and IHC are not available at this time.  CT chest 9/2019: No evidence of metastatic disease 12/23/2020 CT head, chest and abdomen performed in the [facility]: CT head negative for metastatic disease; CT chest demonstrates an increase in the size of the previously seen 8x5 mm nodule in the upper outer right anterior chest which currently measures 14x10 mm.; CT abdomen no evidence of metastatic disease. CT chest - RLL 6 mm and LLL 3 mm (compared with CT 9/2019).   2/19/21: I discussed PET scan and CT with radiologist. Lung lesions too small to confirm melanoma by CT biopsy. Thoracic surgeon would need to be consulted. I called Dr. Woodard, and he believes that the lesion in the right lung is accessible surgically. Also he thinks that there is a possibility of infection as opposed to melanoma. Patient is aware of this and is making an appointment. Patient slides obtained, and confirmed Stage 3b. We discussed that if the lungs are negative for melanoma, then would recommend 1 year adjuvant Opdivo as adjuvant.  3/12/2021 Patient here to review pathology, labs, films. He feels well and is able to play tennis  3/30/2021: 1) metastatic melanoma: Patient is here for C1 NIvolumab.He is feeling well physically and psychologically and ready to start treatment.  We re-reviewed logistics, mechanism of action and most common irAEs from Nivolumab. CT chest 3/30/2021: In comparison with 12/9/2021, status post interval right lower lobe wedge resection. 6 mm right lower lobe nodule (series 2 image 96) is enlarged; previously 2 mm. The remaining 8 mm and smaller lung nodules annotated on series 2 are stable. Difficult to delineate asymmetric soft tissue density lateral to the right pectoral muscle (series 2 image 38) is not significantly changed in size but contains a new small focus of hypodensity (series 601B image 74), possibly postprocedural seroma. 2) COVID-19 infection: Patient was tested positive for COVID-19 on 3/16.  He is asymptomatic and denies fever, chills, change in taste/smell, cough, SOB , diarrhea or fatigue. 3) Vaccine: Rah and Rah vaccine was given on 3/25/2021.   4/14/2021: 1) Metastatic melanoma: Patient is here for toxicity evaluation s/p one cycle of Nivolumab on 3/30/2021.  Patient did well and reports no significant irAEs.  Denies fever, chills, fatigue, arthralgia, myalgia, cough, rash, diarrhea, cough or chest pain. 2) Facial burning: Patient underwent Blue light procedure on 4/13/2021 with Dermatology.  He noticed "burning" on his face a couple of hour after his procedure which is causing some irritation.  He is scheduled to be evaluated by dermatology today.  5/11/2021: Metastatic melanoma: Patient is here for toxicity evaluation s/p two cycle of Nivolumab. Patient did well and reports no significant irAEs.  Denies fever, chills, fatigue, arthralgia, myalgia, cough, rash, diarrhea, cough or chest pain. COVID side effects have improved and completely resolved. No irAEs Has started swimming again [employer]image after C#4  5/25/2021: 1) melanoma: Patient is here for C3 Nivolumab.  He is doing well and reports no significant irAEs.   Denies fever, chills, fatigue, arthralgia, myalgia, cough, rash, diarrhea, cough or chest pain. Patient admits to grade one fatigue which he is able to push through.  No restrictions with his ADLs.  He is able to swim. We reviewed labs, no acute abnormality noted.  WIll recheck TSH levels today.  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 patient had covid and is feeling better but not at baseline - he is able to swim long distances but notes he gets fatigued at times when walks distances Due for CT scan next week.  9/14/2021: 1) Melanoma: Patient is here for C7 Nivolumab. Patient is doing well on treatment and voices no new complaints. Denies any significant irAEs. Denies fever, chills,  cough, SOB, arthralgia, myalgia, diarrhea. 2) checkpoint inhibitor induced dermatitis, : Has improved. Currently not using any ointments or cream.  Patient has been moisturizer. 3) Hypothyroid: Patient was evaluated by Endocrine, Dr. Martell who recommended Levothyroxine 125mcg Her thyroid functions are improving  4) Social: Patient is currently living at apartment close to [facility] house. He is currently not working.  11/9/2021: 1) Melanoma: Patient is here for C9 Nivolumab. Patient is doing well on treatment and voices no new complaints. Denies any significant irAEs today.  Denies fever, chills, cough, SOB, arthralgia, myalgia, diarrhea. 2) checkpoint inhibitor induced dermatitis: Has improved on phototherapy & topical steroids not currently using any ointments or cream.  Patient has been moisturizer. 3) Hypothyroid: Patient was evaluated by Endocrine, Dr. Martell who recommended Levothyroxine 137 mcg Her thyroid functions are improving. 4) Right upper quadrant pain: Patient admits to pain in the right lateral abdominal area x few months. Will have radiology review imaging from 10/2021. Appears to be musculoskeletal in nature.   1) Melanoma:  C12 Nivolumab. He is tolerating treatment well and reports no significant irAEs. Labs reviewed with patient on 1/4/2022, no acute abnormality. CT chest, abdomen/pelvis ordered. He has no restrictions with his ADLs. He is now ice skating. Diet and weight remain stable. He is being followed by Dr. 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  Currently on Levothyroxine 137 mcg.  3/1/2022 1)Melanoma C13 Nivolumab today. He is tolerating treatment extremely well and reports no significant irAEs. Patient underwent CT chest, abdomen/pelvis on 2/15/2022 which demonstrated the previously noted right lower lobe nodule no longer visualized. Otherwise, stable exam. He was evaluated by endocrine for hypothyroid, patient is now on Levothyroxine 150 mcg daily. He has no restrictions with his ADLs. Patient admits to grade one fatigue but is able to push through . He is able to swim ~ 2 miles/day.  Continues UV q 2 weeks, and will discuss stopping it. Notes dry mouth and taste.  3/29/2022: C14 Nivolumab today. He is tolerating treatment relatively well and reports no significant irAEs. He has no restrictions with his ADLs. Labs reviewed and LFT's are trending upwards. Grade one fatigue but is able to push through. He is currently on Levothyroxine 150 mcg daily and doing well. He is adjusting to the dose and will not start Cytomel at this time. Due to dry mouth Sjogren syndrome labs were ordered however not performed last visit, and will do so this time. He does note improved taste after a course of clotrimazole.  4/26/2022: C15 Nivolumab today. Labs reviewed, no acute abnormality. Thyroid functions remain WNL. Sjogren labs performed due to dry mouth, labs WNL. Tolerating treatment extremely well and reports on significant irAEs. Danny (Signatera) performed on 3/1/2022: Signatera Positive ; MTM/mL: 0.65  5/24/22 Patient will have C16 nivolumab. Repeat ct DNA next cycle. If positive, get PET. If negative, consider a few more treatments and stop or complete 2 year course. Thyroid issue seems to be addressed. Phototherapy is now once a month. Had stopped and then restarted due to increase in dermatitis. Mental state is positive. Remains underactive in terms of exercise. Has been supporting his father and family with medical issues.  7/19/2022 C18 Nivolumab today. Patient is tolerating treatment well and reports no significant irAEs. Patient is no longer receiving light therapy from dermatology. Patient is currently on Levothyroxine from 150 mcg to 137 mcg. Admits to grade one fatigue intermittently but has improved. He is now back to baseline. He continues to swim and has no restrictions with his ADLs.  8/16/22 C19 Nivolumab Doing well overall. Lisa has converted to negative on 7/19/22 Will continue CT scan q 4 months. Needs full TFTs today. Able to swim for long periods of time. Stopped phototherapy for  checkpoint inhibitor induced dermatitis. His only complaint is ear scaling.  9/15/2022: C20 Nivolumab on 9/13/2022 He is doing well on treatment and voices no major irAEs. Grade one fatigue but is able to push through. We reviewed his CT chest, abdomen/pelvis on 8/2022. Next CT 12/2022. Lisa on draw#2 negative on 7/29/2022 Next draw 10/2022.  10/07/2022 S/P C20 Nivolumab on 09/13/2022, and next cycle will be on 10/11/2022 He c/o pain to left side lower back. Also, he has some fatigue. His ear infection has cleared with use of medicated drops. CT-Scan of chest of 8/25/2022 showed slight increase of right chest wall nodule. Will get US and possible biopsy of nodule to r/o abnormal pathology.  11/08/2022: C22 Nivolumab today. US biopsy of chest wall negative on 10/2022. Patient is feeling well today and voices no major irAEs. Admits to grade one fatigue intermittently however is able to push through. Danny franklin D#3 today. Intermittent rash on his lower back and forehead.  He is using Triamcinolone on his lower back and Metronidazole on his forehead.  12/6/22 Patient here for C23 Will complete one more dose and then start surveillance with scans and ct-DNA q 3 months. Will obtain lab monitoring at home. He dose still have fatigue and thinks that it is related to low testosterone. Has not been sexually active.  01/03/2023 C24 Nivolumab today. Lisa Delgado D#3 on 11/2022 negative. D#4 02/2023 CT chest, abdomen/pelvis on 12/15/2022 which revealed no evidence of recurrent and/or metastatic disease. He is tolerating treatment relatively well and reports no major irAEs. Labs reviewed. Hormones are WNL.  01/31/2023: C25 Nivolumab today. Will treat for 26 cycles and stop. No major irAEs. D#4 Signatera on 01/03/2023 negative. He is doing well and voices no major complaints. Patient has gained weight while on treatment. Has not been exercising as often.  02/28/2023 C26 Nivolumab today (last treatment) No major irAEs. CT chest, abdomen/pelvis on 03/02/2023. He was seen by cardiology (Dr. Bingham) due to CORLEY. He underwent ECG and TTE which revealed no acute abnormality. His EF is 59%, normal LV systolic function. His exercise induced stress test did not reveal any acute abnormality. He was advised to undergo CT chest (calcium score) and will schedule.  07/07/2023 He is here for his follow up for metastatic melanoma. He was seen by Dr. Zaragoza (Ortho) for bilateral foot pain, R>L and swelling x 3 weeks. Patient has been doing PT and home stretch exercises which has been helping. His last signatera on 04/2023 was negative. Will repeat today. Last CT on 03/2023 stable. Next CT on 09/2023. Patient continues to have issues with his energy level. He is resting more and not as active however has no restrictions with his routine/instrumental ADLs. Patient has gained weight.  3/7/2024 Signatera Danny 2/19/2024 = 0.00 CT 2/21/2024 LUNGS AND LARGE AIRWAYS: Patent central airways. Stable submillimeter right upper lobe pulmonary nodule on image 27 of series 303. Left lower lobe tiny nodules bilaterally seen on the current exam. Patient is again noted to be post right lower lobe wedge resection PLEURA: No pleural effusion. VESSELS: Within normal limits. HEART: Heart size is normal. No pericardial effusion. MEDIASTINUM AND BRIDGER: No lymphadenopathy. CHEST WALL AND LOWER NECK: Scattered subcutaneous nodules similar in appearance to prior study. Representative nodule on image 29 of series 303 measures 7 mm and is unchanged. Right axillary scarring is also unchanged. Right sided mild gynecomastia is stable as well. LIVER: Within normal limits. BILE DUCTS: Normal caliber. GALLBLADDER: Within normal limits. SPLEEN: Unremarkable PANCREAS: Within normal limits. ADRENALS: Within normal limits. KIDNEYS/URETERS: Within normal limits. BLADDER: Minimally distended. REPRODUCTIVE ORGANS: Prostate within normal limits. BOWEL: No bowel obstruction. Appendix is normal. A small hiatal hernia is seen with mild colonic diverticulosis without diverticulitis PERITONEUM: No ascites VESSELS: Within normal limits. RETROPERITONEUM/LYMPH NODES: No lymphadenopathy. Prominent bilateral inguinal lymph nodes are unchanged. ABDOMINAL WALL: 2 small fat-containing inguinal hernias. Small midline fat-containing ventral hernia. BONES: Degenerative changes. IMPRESSION: Stable examination.  Feeling well and voices no new complaints. Addressed all questions. Looking to lose some weight. Plans to go to the [facility]. He is looking to take care of his parents more here, and work with [facility] remotely.  7/1/24 Last Signatera Danny on 6/12 is not detected. Will repeat in October 2024. Feeling well and voices no major complaints. 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  10/3/2024 Patient with h/o melanoma just in lungs. He was on treatment for 2 years. Had imaging done at [facility]. I had it loaded. Will ask radiology to review the PET - there is a liver and spine T7 lesion. Had rise of Signatera.  10/23/24 He is for C1 Ipilimumab (3) + Nivolumab (1) Was evaluated by Dr. Jeff Briscoe (Urology) for sperm banking. Patient wanted to discuss his labs prior to receiving therapy. He was educated no contraindications to receive therapy with positive Hb AB. He was positive in the past (2/2021) and had negative antigens. He was told his syphillis titers were positive. Unable to located in his chart. Requested labs be sent over to our team. He will need to be seen one week prior to C2 for toxicity evaluation and labs.  11/6/24 Patient is here for toxicity evaluation s/p 1 cycle of Ipilimumab (3) + Nivolumab (1). Did well and voices no major irAEs. Mild headache one day which has subsided Labs today. 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 Mild fatigue but is able to push through.  11/25/24 S/P 2 cycles of Ipi 3 + Nivo 1 on 11/13. C3 is scheduled for 12/4. Patient completed SRS to T spine (T7) in 1 fraction on 11/19/24. Labs today. Doing well, no major irAEs. Admits to soreness in the T7 region after radiation. No major pain. Admits to dry mouth and was given Clotrimazole with relief.  12/19/24 Patient is here s/p 3 cycles of Ipi 3 and Nivo 1. C4 is scheduled for 12/26/24. Patient endorses headache in the back of his head and frontal lobe. It has been more consistent over the last 5-7 days. No triggering factors a/w pain. No trauma to area. Pain is very mild and abates without acute intervention. Does not wake him up in the middle of the night. No change in mental status. No history of migraines in the past.  1/29/25 He is s/p 1 cycle of Nivolumab 480 mg/dose on 1/23/25 He is s/p mechanical fall a few weeks and was seen by urgent care. Patient recalled the incident and did NOT his head. He landed on his right lower back/buttock area + right side of his body. He was able to get up and perform his routine/instrumental ADLs afterwards. Three days later patient was having severe pain in the right rib and went to urgent care. XRAYs were negative. He was given pain medication/muscle relaxers. PET/CT scheduled for 2/3/25  02/20/25 Reports occasional back soreness which is improving Has MRI cervical spine in a few days  Had thrush but thinks it has improved; it numbed his mouth so he stopped taking; currently has dry lips/mouth, inside of mouth feels burned like after eating something hot, taste feels different Skin feels very good, no rashes During last treatment had some prickling/small red dots in chest but resolved No headaches for over a month Occasional fatigue C2 nivolumab today Losing some weight intentionally from being busier; eating vegan and less processed foods, drinking more infused water  4/24/2025 C8 Nivolumab at 480 mg/dose. Signatera on 4/25 - not detected Doing well. Dry mouth has improved. Taste is returning. He is going to [facility]. Grade one fatigue but is able to push through. 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 Completed SBRT to the T spine cGy in 1 fraction 11/19/2024. Repeat MR T spine in mid-June 2025.  6/19/2025 C10 Nivolumab today at 480 mg/dose. Doing well. No major irAEs. Grade one fatigue but is able to push through.  7/22/25 C11 Nivolumab today. US neck was performed which revealed left neck at level 2, there is a round lymph node with no clear fatty hilum, measuring 1.1 x 0.7 x 0.8 cm. Subsequently, he underwent biopsy of the on 7/15 which was negative for malignancy. Of note, patient underwent left dental extraction at [facility]. He was noted to have an infection and cavity and underwent extraction prior to his PET/CT in early-mid June 2025. His treatment was delayed due to travel overseas however patient did not go. He is considering going in the next couple of weeks however will need to delay his treatment further which is not recommended. No major irAEs noted. Labs reviewed.      [de-identified] : Surgical Oncology: Santos Seals Thoracic Surgeon: Gatito Woodard  [facility]: [phone]  Pt's sister Yohana Baca is best contact:  [phone]  Patient sister home: [phone] Patient new cell: [phone] Back up number - [phone]

## 2025-07-30 NOTE — ASSESSMENT
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 Notes that he will need immunotherapy for 2 years total as per his oncology team. Pt has never previously been on thyroid medications.   1. Check Point Inhibitor induced Hypothyroidism.  -April 2025 TSH 1.2 -Continue Levothyroxine 100mcg QD  -Check point inhibitors are known to cause endocrinopathies including adrenal insufficiency, diabetes, hypophysitis/hypopituitarism.   -Repeat TFTs in 3 months  2. Hypogonadism  Total Testosterone 287 Pt to start clomid 25mg QD.  -Repeat testosterone, lipids, FSH and LH in 1 month.   Patient verbalized understanding of the above. All questions were answered to patient's satisfaction. Dispo: Patient will follow up in 2 months TEB to review above results.

## 2025-07-30 NOTE — HISTORY OF PRESENT ILLNESS
[FreeTextEntry1] : Mr. Robles is presenting for follow up of check point Inhibitor induced Hypothyroidism.   Oncologist: Dr. Ayala  Dermatologist: Dr. Carrillo, Cynthia   60 year old male with metastatic melanoma (diagnosed feb 2021) s/p RLL wedge resection x 2 (March 2, 2021) PET Scan showed liver and spine T7 lesion, Pt had targeted radiation to spine at T7 level Feb 2025) currently undergoing treatment with check point inhibitor (nivolumab) started March 30th, 2021 and completed after 26 months on Feb 2025, nivolumab induced dermatitis found to have TSH of 76.40 (July 20th). Notes that he will need immunotherapy for 2 years total. Pt was euthyroid prior to treatment. Pt has noted dryness in skin within the last 2 months which has improved. Evaluated by dermatology and being treated for dermatitis. Pt has gained about 30 pounds since Feb 2021. Pt is a vegan, h/o low B12 level. Pt eats a lot of fruit every morning, notes he has regular bowel movements per day.   Pertinent Labs:  TSH 7/2021 76.40  --> 13 8/2021 --> Nov 2021 2.44 --> 2.0 Dec 2021 --> 5.14 Dec 2021 --> 5.18 Jan 2022Jan 2022 Feb 2023 TSH 4.34 on LT4 137mcg QD.  FT4 <0.01 --> 1.3 --> Jan 1.5  TT3 <20 --> 98  TPO Ab 2166  TG Ab 87.3 Sodium 139, Potassium 4.5  Vitamin B12 <150 --> >2000 7/2021 --> 222 Oct 2021 --> 450 Dec 2021 Testosterone level is low, 171 (drawn 8am) --> 304  LH 7.9, FSH 13.  IGF-1 75  Prolactin 12.7  AM cortisol 18.1  ACTH 62.3   Interval hx:  Feb 2025 TSH 0.17 on LT4 137mcg QD --> reduced to 100mcg QD  April 2025 TSH 1.13, FT4 1.5 on LT4 100mcg QD  Per urology, as he is interested in fertility he wanted him to start clomiphene citrate but unable to obtain PA. Prior semen analysis demonstrated very low number of sperm with poor motility and morphology. IVF with single sperm injection would be the best options for him. Above information updated as needed.